# Patient Record
Sex: FEMALE | Race: WHITE | URBAN - METROPOLITAN AREA
[De-identification: names, ages, dates, MRNs, and addresses within clinical notes are randomized per-mention and may not be internally consistent; named-entity substitution may affect disease eponyms.]

---

## 2017-01-01 ENCOUNTER — TELEPHONE (OUTPATIENT)
Dept: GASTROENTEROLOGY | Facility: CLINIC | Age: 67
End: 2017-01-01

## 2017-01-01 ENCOUNTER — OFFICE VISIT (OUTPATIENT)
Dept: RADIOLOGY | Facility: CLINIC | Age: 67
End: 2017-01-01

## 2017-01-01 ENCOUNTER — APPOINTMENT (OUTPATIENT)
Dept: SPEECH THERAPY | Facility: CLINIC | Age: 67
DRG: 442 | End: 2017-01-01
Attending: PHYSICIAN ASSISTANT
Payer: MEDICARE

## 2017-01-01 ENCOUNTER — APPOINTMENT (OUTPATIENT)
Dept: PHYSICAL THERAPY | Facility: CLINIC | Age: 67
DRG: 442 | End: 2017-01-01
Attending: PHYSICIAN ASSISTANT
Payer: MEDICARE

## 2017-01-01 ENCOUNTER — APPOINTMENT (OUTPATIENT)
Dept: OCCUPATIONAL THERAPY | Facility: CLINIC | Age: 67
DRG: 442 | End: 2017-01-01
Attending: PHYSICIAN ASSISTANT
Payer: MEDICARE

## 2017-01-01 ENCOUNTER — PRE VISIT (OUTPATIENT)
Dept: GASTROENTEROLOGY | Facility: CLINIC | Age: 67
End: 2017-01-01

## 2017-01-01 ENCOUNTER — TELEPHONE (OUTPATIENT)
Dept: INTERVENTIONAL RADIOLOGY/VASCULAR | Facility: CLINIC | Age: 67
End: 2017-01-01

## 2017-01-01 ENCOUNTER — HOSPITAL ENCOUNTER (EMERGENCY)
Facility: CLINIC | Age: 67
Discharge: NURSING, REHAB OR LONG-TERM CARE | End: 2017-01-06
Attending: EMERGENCY MEDICINE | Admitting: EMERGENCY MEDICINE
Payer: MEDICARE

## 2017-01-01 ENCOUNTER — CARE COORDINATION (OUTPATIENT)
Dept: CARDIOLOGY | Facility: CLINIC | Age: 67
End: 2017-01-01

## 2017-01-01 ENCOUNTER — APPOINTMENT (OUTPATIENT)
Dept: ULTRASOUND IMAGING | Facility: CLINIC | Age: 67
End: 2017-01-01
Attending: EMERGENCY MEDICINE
Payer: MEDICARE

## 2017-01-01 ENCOUNTER — HOSPITAL ENCOUNTER (INPATIENT)
Dept: VASCULAR ULTRASOUND | Facility: CLINIC | Age: 67
DRG: 442 | End: 2017-03-31
Attending: PHYSICIAN ASSISTANT
Payer: MEDICARE

## 2017-01-01 ENCOUNTER — DOCUMENTATION ONLY (OUTPATIENT)
Dept: OTHER | Facility: CLINIC | Age: 67
End: 2017-01-01

## 2017-01-01 ENCOUNTER — RECORDS - HEALTHEAST (OUTPATIENT)
Dept: LAB | Facility: CLINIC | Age: 67
End: 2017-01-01

## 2017-01-01 ENCOUNTER — APPOINTMENT (OUTPATIENT)
Dept: GENERAL RADIOLOGY | Facility: CLINIC | Age: 67
End: 2017-01-01
Attending: EMERGENCY MEDICINE
Payer: MEDICARE

## 2017-01-01 ENCOUNTER — OFFICE VISIT (OUTPATIENT)
Dept: GASTROENTEROLOGY | Facility: CLINIC | Age: 67
End: 2017-01-01
Attending: INTERNAL MEDICINE
Payer: MEDICARE

## 2017-01-01 ENCOUNTER — TRANSFERRED RECORDS (OUTPATIENT)
Dept: HEALTH INFORMATION MANAGEMENT | Facility: CLINIC | Age: 67
End: 2017-01-01

## 2017-01-01 ENCOUNTER — APPOINTMENT (OUTPATIENT)
Dept: ULTRASOUND IMAGING | Facility: CLINIC | Age: 67
DRG: 442 | End: 2017-01-01
Attending: PHYSICIAN ASSISTANT
Payer: MEDICARE

## 2017-01-01 ENCOUNTER — HOSPITAL ENCOUNTER (INPATIENT)
Facility: CLINIC | Age: 67
LOS: 3 days | Discharge: NURSING, REHAB OR LONG-TERM CARE | DRG: 442 | End: 2017-03-31
Attending: EMERGENCY MEDICINE | Admitting: INTERNAL MEDICINE
Payer: MEDICARE

## 2017-01-01 ENCOUNTER — APPOINTMENT (OUTPATIENT)
Dept: GENERAL RADIOLOGY | Facility: CLINIC | Age: 67
DRG: 442 | End: 2017-01-01
Attending: EMERGENCY MEDICINE
Payer: MEDICARE

## 2017-01-01 ENCOUNTER — APPOINTMENT (OUTPATIENT)
Dept: INTERVENTIONAL RADIOLOGY/VASCULAR | Facility: CLINIC | Age: 67
End: 2017-01-01
Attending: EMERGENCY MEDICINE
Payer: MEDICARE

## 2017-01-01 VITALS
SYSTOLIC BLOOD PRESSURE: 107 MMHG | DIASTOLIC BLOOD PRESSURE: 42 MMHG | HEART RATE: 80 BPM | TEMPERATURE: 98 F | RESPIRATION RATE: 20 BRPM | OXYGEN SATURATION: 94 % | BODY MASS INDEX: 41.02 KG/M2 | WEIGHT: 293 LBS | HEIGHT: 71 IN

## 2017-01-01 VITALS
HEART RATE: 76 BPM | BODY MASS INDEX: 35.94 KG/M2 | OXYGEN SATURATION: 98 % | SYSTOLIC BLOOD PRESSURE: 148 MMHG | DIASTOLIC BLOOD PRESSURE: 78 MMHG | WEIGHT: 257.6 LBS | TEMPERATURE: 98.1 F

## 2017-01-01 VITALS — DIASTOLIC BLOOD PRESSURE: 65 MMHG | HEART RATE: 78 BPM | SYSTOLIC BLOOD PRESSURE: 124 MMHG

## 2017-01-01 VITALS
TEMPERATURE: 97.4 F | WEIGHT: 189 LBS | SYSTOLIC BLOOD PRESSURE: 125 MMHG | BODY MASS INDEX: 26.36 KG/M2 | RESPIRATION RATE: 18 BRPM | DIASTOLIC BLOOD PRESSURE: 50 MMHG | OXYGEN SATURATION: 95 %

## 2017-01-01 DIAGNOSIS — R18.8 OTHER ASCITES: ICD-10-CM

## 2017-01-01 DIAGNOSIS — K76.82 HEPATIC ENCEPHALOPATHY (H): Primary | ICD-10-CM

## 2017-01-01 DIAGNOSIS — K76.82 HEPATIC ENCEPHALOPATHY (H): ICD-10-CM

## 2017-01-01 DIAGNOSIS — Z95.828 S/P TIPS (TRANSJUGULAR INTRAHEPATIC PORTOSYSTEMIC SHUNT): Primary | ICD-10-CM

## 2017-01-01 DIAGNOSIS — K74.60 LIVER CIRRHOSIS SECONDARY TO NASH (H): ICD-10-CM

## 2017-01-01 DIAGNOSIS — K74.60 LIVER CIRRHOSIS SECONDARY TO NASH (H): Primary | ICD-10-CM

## 2017-01-01 DIAGNOSIS — K75.81 LIVER CIRRHOSIS SECONDARY TO NASH (H): Primary | ICD-10-CM

## 2017-01-01 DIAGNOSIS — R18.8 ASCITES: ICD-10-CM

## 2017-01-01 DIAGNOSIS — K75.81 LIVER CIRRHOSIS SECONDARY TO NASH (H): ICD-10-CM

## 2017-01-01 DIAGNOSIS — R18.8 ASCITES OF LIVER: ICD-10-CM

## 2017-01-01 DIAGNOSIS — R60.0 BILATERAL EDEMA OF LOWER EXTREMITY: ICD-10-CM

## 2017-01-01 DIAGNOSIS — E87.70 HYPERVOLEMIA, UNSPECIFIED HYPERVOLEMIA TYPE: ICD-10-CM

## 2017-01-01 DIAGNOSIS — K74.69 OTHER CIRRHOSIS OF LIVER (H): ICD-10-CM

## 2017-01-01 DIAGNOSIS — Z71.89 ACP (ADVANCE CARE PLANNING): Chronic | ICD-10-CM

## 2017-01-01 DIAGNOSIS — N30.00 ACUTE CYSTITIS WITHOUT HEMATURIA: Primary | ICD-10-CM

## 2017-01-01 DIAGNOSIS — R41.82 ALTERED MENTAL STATUS, UNSPECIFIED ALTERED MENTAL STATUS TYPE: ICD-10-CM

## 2017-01-01 LAB
ABO + RH BLD: NORMAL
ABO + RH BLD: NORMAL
ALBUMIN SERPL-MCNC: 2 G/DL (ref 3.4–5)
ALBUMIN SERPL-MCNC: 2.1 G/DL (ref 3.4–5)
ALBUMIN SERPL-MCNC: 2.3 G/DL (ref 3.4–5)
ALBUMIN SERPL-MCNC: 2.4 G/DL (ref 3.4–5)
ALBUMIN SERPL-MCNC: 2.5 G/DL (ref 3.4–5)
ALBUMIN UR-MCNC: NEGATIVE MG/DL
ALP SERPL-CCNC: 112 U/L (ref 40–150)
ALP SERPL-CCNC: 152 U/L (ref 40–150)
ALP SERPL-CCNC: 154 U/L (ref 40–150)
ALP SERPL-CCNC: 162 U/L (ref 40–150)
ALP SERPL-CCNC: 93 U/L (ref 40–150)
ALP SERPL-CCNC: 97 U/L (ref 40–150)
ALP SERPL-CCNC: 99 U/L (ref 40–150)
ALT SERPL W P-5'-P-CCNC: 14 U/L (ref 0–50)
ALT SERPL W P-5'-P-CCNC: 15 U/L (ref 0–50)
ALT SERPL W P-5'-P-CCNC: 16 U/L (ref 0–50)
ALT SERPL W P-5'-P-CCNC: 16 U/L (ref 0–50)
ALT SERPL W P-5'-P-CCNC: 17 U/L (ref 0–50)
ALT SERPL W P-5'-P-CCNC: 18 U/L (ref 0–50)
ALT SERPL W P-5'-P-CCNC: 20 U/L (ref 0–50)
AMMONIA PLAS-SCNC: 194 UMOL/L (ref 10–50)
AMMONIA PLAS-SCNC: 25 UMOL/L (ref 10–50)
AMMONIA PLAS-SCNC: 79 UMOL/L (ref 10–50)
ANION GAP SERPL CALCULATED.3IONS-SCNC: 10 MMOL/L (ref 3–14)
ANION GAP SERPL CALCULATED.3IONS-SCNC: 10 MMOL/L (ref 3–14)
ANION GAP SERPL CALCULATED.3IONS-SCNC: 7 MMOL/L (ref 3–14)
ANION GAP SERPL CALCULATED.3IONS-SCNC: 8 MMOL/L (ref 3–14)
ANION GAP SERPL CALCULATED.3IONS-SCNC: 9 MMOL/L (ref 3–14)
APPEARANCE UR: CLEAR
AST SERPL W P-5'-P-CCNC: 19 U/L (ref 0–45)
AST SERPL W P-5'-P-CCNC: 20 U/L (ref 0–45)
AST SERPL W P-5'-P-CCNC: 22 U/L (ref 0–45)
AST SERPL W P-5'-P-CCNC: 23 U/L (ref 0–45)
AST SERPL W P-5'-P-CCNC: 30 U/L (ref 0–45)
BACTERIA SPEC CULT: ABNORMAL
BACTERIA SPEC CULT: NO GROWTH
BACTERIA SPEC CULT: NO GROWTH
BASOPHILS # BLD AUTO: 0 10E9/L (ref 0–0.2)
BASOPHILS # BLD AUTO: 0 10E9/L (ref 0–0.2)
BASOPHILS NFR BLD AUTO: 0.4 %
BASOPHILS NFR BLD AUTO: 0.8 %
BILIRUB DIRECT SERPL-MCNC: 0.3 MG/DL (ref 0–0.2)
BILIRUB DIRECT SERPL-MCNC: 0.4 MG/DL (ref 0–0.2)
BILIRUB SERPL-MCNC: 0.6 MG/DL (ref 0.2–1.3)
BILIRUB SERPL-MCNC: 0.8 MG/DL (ref 0.2–1.3)
BILIRUB SERPL-MCNC: 0.9 MG/DL (ref 0.2–1.3)
BILIRUB SERPL-MCNC: 1 MG/DL (ref 0.2–1.3)
BILIRUB SERPL-MCNC: 1.3 MG/DL (ref 0.2–1.3)
BILIRUB SERPL-MCNC: 1.3 MG/DL (ref 0.2–1.3)
BILIRUB SERPL-MCNC: 1.4 MG/DL (ref 0.2–1.3)
BILIRUB UR QL STRIP: NEGATIVE
BLD GP AB SCN SERPL QL: NORMAL
BLOOD BANK CMNT PATIENT-IMP: NORMAL
BUN SERPL-MCNC: 25 MG/DL (ref 7–30)
BUN SERPL-MCNC: 25 MG/DL (ref 7–30)
BUN SERPL-MCNC: 26 MG/DL (ref 7–30)
BUN SERPL-MCNC: 31 MG/DL (ref 7–30)
BUN SERPL-MCNC: 34 MG/DL (ref 7–30)
BUN SERPL-MCNC: 35 MG/DL (ref 7–30)
BUN SERPL-MCNC: 37 MG/DL (ref 7–30)
CALCIUM SERPL-MCNC: 7.4 MG/DL (ref 8.5–10.1)
CALCIUM SERPL-MCNC: 7.8 MG/DL (ref 8.5–10.1)
CALCIUM SERPL-MCNC: 8 MG/DL (ref 8.5–10.1)
CALCIUM SERPL-MCNC: 8.2 MG/DL (ref 8.5–10.1)
CALCIUM SERPL-MCNC: 8.4 MG/DL (ref 8.5–10.1)
CALCIUM SERPL-MCNC: 8.5 MG/DL (ref 8.5–10.1)
CALCIUM SERPL-MCNC: 8.6 MG/DL (ref 8.5–10.1)
CHLORIDE SERPL-SCNC: 102 MMOL/L (ref 94–109)
CHLORIDE SERPL-SCNC: 104 MMOL/L (ref 94–109)
CHLORIDE SERPL-SCNC: 104 MMOL/L (ref 94–109)
CHLORIDE SERPL-SCNC: 107 MMOL/L (ref 94–109)
CHLORIDE SERPL-SCNC: 108 MMOL/L (ref 94–109)
CHLORIDE SERPL-SCNC: 109 MMOL/L (ref 94–109)
CHLORIDE SERPL-SCNC: 110 MMOL/L (ref 94–109)
CO2 SERPL-SCNC: 25 MMOL/L (ref 20–32)
CO2 SERPL-SCNC: 26 MMOL/L (ref 20–32)
CO2 SERPL-SCNC: 27 MMOL/L (ref 20–32)
CO2 SERPL-SCNC: 28 MMOL/L (ref 20–32)
CO2 SERPL-SCNC: 28 MMOL/L (ref 20–32)
CO2 SERPL-SCNC: 29 MMOL/L (ref 20–32)
CO2 SERPL-SCNC: 30 MMOL/L (ref 20–32)
COLOR UR AUTO: ABNORMAL
CREAT SERPL-MCNC: 1.22 MG/DL (ref 0.6–1.1)
CREAT SERPL-MCNC: 1.33 MG/DL (ref 0.6–1.1)
CREAT SERPL-MCNC: 1.34 MG/DL (ref 0.6–1.1)
CREAT SERPL-MCNC: 1.39 MG/DL (ref 0.6–1.1)
CREAT SERPL-MCNC: 1.44 MG/DL (ref 0.6–1.1)
CREAT SERPL-MCNC: 1.45 MG/DL (ref 0.6–1.1)
CREAT SERPL-MCNC: 1.5 MG/DL (ref 0.52–1.04)
CREAT SERPL-MCNC: 1.53 MG/DL (ref 0.6–1.1)
CREAT SERPL-MCNC: 1.64 MG/DL (ref 0.52–1.04)
CREAT SERPL-MCNC: 1.69 MG/DL (ref 0.52–1.04)
CREAT SERPL-MCNC: 1.88 MG/DL (ref 0.52–1.04)
CREAT SERPL-MCNC: 1.91 MG/DL (ref 0.52–1.04)
DIFFERENTIAL METHOD BLD: ABNORMAL
DIFFERENTIAL METHOD BLD: ABNORMAL
EOSINOPHIL # BLD AUTO: 0.2 10E9/L (ref 0–0.7)
EOSINOPHIL # BLD AUTO: 0.3 10E9/L (ref 0–0.7)
EOSINOPHIL NFR BLD AUTO: 2.5 %
EOSINOPHIL NFR BLD AUTO: 5.5 %
ERYTHROCYTE [DISTWIDTH] IN BLOOD BY AUTOMATED COUNT: 15.7 % (ref 10–15)
ERYTHROCYTE [DISTWIDTH] IN BLOOD BY AUTOMATED COUNT: 15.8 % (ref 10–15)
ERYTHROCYTE [DISTWIDTH] IN BLOOD BY AUTOMATED COUNT: 16.1 % (ref 10–15)
ERYTHROCYTE [DISTWIDTH] IN BLOOD BY AUTOMATED COUNT: 16.1 % (ref 10–15)
ERYTHROCYTE [DISTWIDTH] IN BLOOD BY AUTOMATED COUNT: 16.2 % (ref 10–15)
ERYTHROCYTE [DISTWIDTH] IN BLOOD BY AUTOMATED COUNT: 16.4 % (ref 10–15)
ERYTHROCYTE [DISTWIDTH] IN BLOOD BY AUTOMATED COUNT: 16.6 % (ref 10–15)
GFR SERPL CREATININE-BSD FRML MDRD: 26 ML/MIN/1.7M2
GFR SERPL CREATININE-BSD FRML MDRD: 27 ML/MIN/1.7M2
GFR SERPL CREATININE-BSD FRML MDRD: 30 ML/MIN/1.7M2
GFR SERPL CREATININE-BSD FRML MDRD: 31 ML/MIN/1.7M2
GFR SERPL CREATININE-BSD FRML MDRD: 34 ML/MIN/1.73M2
GFR SERPL CREATININE-BSD FRML MDRD: 35 ML/MIN/1.7M2
GFR SERPL CREATININE-BSD FRML MDRD: 36 ML/MIN/1.73M2
GFR SERPL CREATININE-BSD FRML MDRD: 36 ML/MIN/1.73M2
GFR SERPL CREATININE-BSD FRML MDRD: 38 ML/MIN/1.73M2
GFR SERPL CREATININE-BSD FRML MDRD: 40 ML/MIN/1.73M2
GFR SERPL CREATININE-BSD FRML MDRD: 40 ML/MIN/1.73M2
GFR SERPL CREATININE-BSD FRML MDRD: 44 ML/MIN/1.73M2
GLUCOSE BLDC GLUCOMTR-MCNC: 103 MG/DL (ref 70–99)
GLUCOSE BLDC GLUCOMTR-MCNC: 105 MG/DL (ref 70–99)
GLUCOSE BLDC GLUCOMTR-MCNC: 108 MG/DL (ref 70–99)
GLUCOSE BLDC GLUCOMTR-MCNC: 109 MG/DL (ref 70–99)
GLUCOSE BLDC GLUCOMTR-MCNC: 115 MG/DL (ref 70–99)
GLUCOSE BLDC GLUCOMTR-MCNC: 116 MG/DL (ref 70–99)
GLUCOSE BLDC GLUCOMTR-MCNC: 118 MG/DL (ref 70–99)
GLUCOSE BLDC GLUCOMTR-MCNC: 123 MG/DL (ref 70–99)
GLUCOSE BLDC GLUCOMTR-MCNC: 123 MG/DL (ref 70–99)
GLUCOSE BLDC GLUCOMTR-MCNC: 124 MG/DL (ref 70–99)
GLUCOSE BLDC GLUCOMTR-MCNC: 124 MG/DL (ref 70–99)
GLUCOSE BLDC GLUCOMTR-MCNC: 125 MG/DL (ref 70–99)
GLUCOSE BLDC GLUCOMTR-MCNC: 127 MG/DL (ref 70–99)
GLUCOSE BLDC GLUCOMTR-MCNC: 132 MG/DL (ref 70–99)
GLUCOSE BLDC GLUCOMTR-MCNC: 136 MG/DL (ref 70–99)
GLUCOSE BLDC GLUCOMTR-MCNC: 139 MG/DL (ref 70–99)
GLUCOSE BLDC GLUCOMTR-MCNC: 145 MG/DL (ref 70–99)
GLUCOSE BLDC GLUCOMTR-MCNC: 145 MG/DL (ref 70–99)
GLUCOSE BLDC GLUCOMTR-MCNC: 148 MG/DL (ref 70–99)
GLUCOSE BLDC GLUCOMTR-MCNC: 155 MG/DL (ref 70–99)
GLUCOSE BLDC GLUCOMTR-MCNC: 157 MG/DL (ref 70–99)
GLUCOSE BLDC GLUCOMTR-MCNC: 165 MG/DL (ref 70–99)
GLUCOSE BLDC GLUCOMTR-MCNC: 169 MG/DL (ref 70–99)
GLUCOSE BLDC GLUCOMTR-MCNC: 188 MG/DL (ref 70–99)
GLUCOSE BLDC GLUCOMTR-MCNC: 217 MG/DL (ref 70–99)
GLUCOSE BLDC GLUCOMTR-MCNC: 217 MG/DL (ref 70–99)
GLUCOSE BLDC GLUCOMTR-MCNC: 236 MG/DL (ref 70–99)
GLUCOSE BLDC GLUCOMTR-MCNC: 259 MG/DL (ref 70–99)
GLUCOSE BLDC GLUCOMTR-MCNC: 299 MG/DL (ref 70–99)
GLUCOSE BLDC GLUCOMTR-MCNC: 334 MG/DL (ref 70–99)
GLUCOSE BLDC GLUCOMTR-MCNC: 436 MG/DL (ref 70–99)
GLUCOSE BLDC GLUCOMTR-MCNC: 442 MG/DL (ref 70–99)
GLUCOSE BLDC GLUCOMTR-MCNC: 449 MG/DL (ref 70–99)
GLUCOSE BLDC GLUCOMTR-MCNC: 451 MG/DL (ref 70–99)
GLUCOSE BLDC GLUCOMTR-MCNC: 481 MG/DL (ref 70–99)
GLUCOSE BLDC GLUCOMTR-MCNC: 500 MG/DL (ref 70–99)
GLUCOSE BLDC GLUCOMTR-MCNC: 514 MG/DL (ref 70–99)
GLUCOSE SERPL-MCNC: 114 MG/DL (ref 70–99)
GLUCOSE SERPL-MCNC: 121 MG/DL (ref 70–99)
GLUCOSE SERPL-MCNC: 122 MG/DL (ref 70–99)
GLUCOSE SERPL-MCNC: 154 MG/DL (ref 70–99)
GLUCOSE SERPL-MCNC: 160 MG/DL (ref 70–99)
GLUCOSE SERPL-MCNC: 260 MG/DL (ref 70–99)
GLUCOSE SERPL-MCNC: 72 MG/DL (ref 70–99)
GLUCOSE UR STRIP-MCNC: NEGATIVE MG/DL
HCT VFR BLD AUTO: 29.3 % (ref 35–47)
HCT VFR BLD AUTO: 29.6 % (ref 35–47)
HCT VFR BLD AUTO: 31.3 % (ref 35–47)
HCT VFR BLD AUTO: 31.9 % (ref 35–47)
HCT VFR BLD AUTO: 32.1 % (ref 35–47)
HCT VFR BLD AUTO: 32.5 % (ref 35–47)
HCT VFR BLD AUTO: 32.5 % (ref 35–47)
HGB BLD-MCNC: 10.1 G/DL (ref 11.7–15.7)
HGB BLD-MCNC: 10.2 G/DL (ref 11.7–15.7)
HGB BLD-MCNC: 10.2 G/DL (ref 11.7–15.7)
HGB BLD-MCNC: 9.6 G/DL (ref 11.7–15.7)
HGB BLD-MCNC: 9.6 G/DL (ref 11.7–15.7)
HGB BLD-MCNC: 9.7 G/DL (ref 11.7–15.7)
HGB BLD-MCNC: 9.9 G/DL (ref 11.7–15.7)
HGB UR QL STRIP: ABNORMAL
IMM GRANULOCYTES # BLD: 0 10E9/L (ref 0–0.4)
IMM GRANULOCYTES # BLD: 0 10E9/L (ref 0–0.4)
IMM GRANULOCYTES NFR BLD: 0.1 %
IMM GRANULOCYTES NFR BLD: 0.2 %
INR PPP: 1.19 (ref 0.86–1.14)
INR PPP: 1.21 (ref 0.86–1.14)
INR PPP: 1.23 (ref 0.86–1.14)
INR PPP: 1.24 (ref 0.86–1.14)
INR PPP: 1.25 (ref 0.86–1.14)
INR PPP: 1.34 (ref 0.86–1.14)
INR PPP: 1.34 (ref 0.86–1.14)
INTERPRETATION ECG - MUSE: NORMAL
INTERPRETATION ECG - MUSE: NORMAL
KETONES UR STRIP-MCNC: NEGATIVE MG/DL
LACTATE BLD-SCNC: 1.6 MMOL/L (ref 0.7–2.1)
LEUKOCYTE ESTERASE UR QL STRIP: NEGATIVE
LYMPHOCYTES # BLD AUTO: 0.6 10E9/L (ref 0.8–5.3)
LYMPHOCYTES # BLD AUTO: 0.8 10E9/L (ref 0.8–5.3)
LYMPHOCYTES NFR BLD AUTO: 14.4 %
LYMPHOCYTES NFR BLD AUTO: 9.2 %
Lab: ABNORMAL
MCH RBC QN AUTO: 27.6 PG (ref 26.5–33)
MCH RBC QN AUTO: 27.7 PG (ref 26.5–33)
MCH RBC QN AUTO: 27.9 PG (ref 26.5–33)
MCH RBC QN AUTO: 28.1 PG (ref 26.5–33)
MCH RBC QN AUTO: 28.1 PG (ref 26.5–33)
MCH RBC QN AUTO: 28.7 PG (ref 26.5–33)
MCH RBC QN AUTO: 28.9 PG (ref 26.5–33)
MCHC RBC AUTO-ENTMCNC: 30.2 G/DL (ref 31.5–36.5)
MCHC RBC AUTO-ENTMCNC: 31.4 G/DL (ref 31.5–36.5)
MCHC RBC AUTO-ENTMCNC: 31.4 G/DL (ref 31.5–36.5)
MCHC RBC AUTO-ENTMCNC: 31.6 G/DL (ref 31.5–36.5)
MCHC RBC AUTO-ENTMCNC: 31.7 G/DL (ref 31.5–36.5)
MCHC RBC AUTO-ENTMCNC: 32.4 G/DL (ref 31.5–36.5)
MCHC RBC AUTO-ENTMCNC: 32.8 G/DL (ref 31.5–36.5)
MCV RBC AUTO: 86 FL (ref 78–100)
MCV RBC AUTO: 88 FL (ref 78–100)
MCV RBC AUTO: 88 FL (ref 78–100)
MCV RBC AUTO: 89 FL (ref 78–100)
MCV RBC AUTO: 89 FL (ref 78–100)
MCV RBC AUTO: 91 FL (ref 78–100)
MCV RBC AUTO: 91 FL (ref 78–100)
MICRO REPORT STATUS: ABNORMAL
MICRO REPORT STATUS: NORMAL
MICRO REPORT STATUS: NORMAL
MICROORGANISM SPEC CULT: ABNORMAL
MONOCYTES # BLD AUTO: 0.7 10E9/L (ref 0–1.3)
MONOCYTES # BLD AUTO: 0.7 10E9/L (ref 0–1.3)
MONOCYTES NFR BLD AUTO: 10.4 %
MONOCYTES NFR BLD AUTO: 12.4 %
NEUTROPHILS # BLD AUTO: 3.5 10E9/L (ref 1.6–8.3)
NEUTROPHILS # BLD AUTO: 5.2 10E9/L (ref 1.6–8.3)
NEUTROPHILS NFR BLD AUTO: 66.7 %
NEUTROPHILS NFR BLD AUTO: 77.4 %
NITRATE UR QL: NEGATIVE
NRBC # BLD AUTO: 0 10*3/UL
NRBC # BLD AUTO: 0 10*3/UL
NRBC BLD AUTO-RTO: 0 /100
NRBC BLD AUTO-RTO: 1 /100
NT-PROBNP SERPL-MCNC: 744 PG/ML (ref 0–900)
PH UR STRIP: 7.5 PH (ref 5–7)
PLATELET # BLD AUTO: 105 10E9/L (ref 150–450)
PLATELET # BLD AUTO: 122 10E9/L (ref 150–450)
PLATELET # BLD AUTO: 141 10E9/L (ref 150–450)
PLATELET # BLD AUTO: 147 10E9/L (ref 150–450)
PLATELET # BLD AUTO: 157 10E9/L (ref 150–450)
PLATELET # BLD AUTO: 158 10E9/L (ref 150–450)
PLATELET # BLD AUTO: 172 10E9/L (ref 150–450)
POTASSIUM SERPL-SCNC: 3.7 MMOL/L (ref 3.4–5.3)
POTASSIUM SERPL-SCNC: 3.9 MMOL/L (ref 3.4–5.3)
POTASSIUM SERPL-SCNC: 4 MMOL/L (ref 3.4–5.3)
POTASSIUM SERPL-SCNC: 4.1 MMOL/L (ref 3.4–5.3)
POTASSIUM SERPL-SCNC: 4.5 MMOL/L (ref 3.4–5.3)
PROCALCITONIN SERPL-MCNC: 0.09 NG/ML
PROT SERPL-MCNC: 4.8 G/DL (ref 6.8–8.8)
PROT SERPL-MCNC: 5 G/DL (ref 6.8–8.8)
PROT SERPL-MCNC: 5.6 G/DL (ref 6.8–8.8)
PROT SERPL-MCNC: 6 G/DL (ref 6.8–8.8)
PROT SERPL-MCNC: 6 G/DL (ref 6.8–8.8)
PROT SERPL-MCNC: 6.1 G/DL (ref 6.8–8.8)
PROT SERPL-MCNC: 6.1 G/DL (ref 6.8–8.8)
RBC # BLD AUTO: 3.32 10E12/L (ref 3.8–5.2)
RBC # BLD AUTO: 3.42 10E12/L (ref 3.8–5.2)
RBC # BLD AUTO: 3.52 10E12/L (ref 3.8–5.2)
RBC # BLD AUTO: 3.52 10E12/L (ref 3.8–5.2)
RBC # BLD AUTO: 3.56 10E12/L (ref 3.8–5.2)
RBC # BLD AUTO: 3.62 10E12/L (ref 3.8–5.2)
RBC # BLD AUTO: 3.68 10E12/L (ref 3.8–5.2)
RBC #/AREA URNS AUTO: 1 /HPF (ref 0–2)
SODIUM SERPL-SCNC: 136 MMOL/L (ref 133–144)
SODIUM SERPL-SCNC: 139 MMOL/L (ref 133–144)
SODIUM SERPL-SCNC: 140 MMOL/L (ref 133–144)
SODIUM SERPL-SCNC: 145 MMOL/L (ref 133–144)
SODIUM SERPL-SCNC: 146 MMOL/L (ref 133–144)
SODIUM SERPL-SCNC: 146 MMOL/L (ref 133–144)
SODIUM SERPL-SCNC: 147 MMOL/L (ref 133–144)
SP GR UR STRIP: 1.01 (ref 1–1.03)
SPECIMEN EXP DATE BLD: NORMAL
SPECIMEN SOURCE: ABNORMAL
SPECIMEN SOURCE: NORMAL
SPECIMEN SOURCE: NORMAL
SQUAMOUS #/AREA URNS AUTO: <1 /HPF (ref 0–1)
URN SPEC COLLECT METH UR: ABNORMAL
UROBILINOGEN UR STRIP-MCNC: NORMAL MG/DL (ref 0–2)
WBC # BLD AUTO: 5.3 10E9/L (ref 4–11)
WBC # BLD AUTO: 6.7 10E9/L (ref 4–11)
WBC # BLD AUTO: 7 10E9/L (ref 4–11)
WBC # BLD AUTO: 7.1 10E9/L (ref 4–11)
WBC # BLD AUTO: 8.1 10E9/L (ref 4–11)
WBC # BLD AUTO: 8.3 10E9/L (ref 4–11)
WBC # BLD AUTO: 8.4 10E9/L (ref 4–11)
WBC #/AREA URNS AUTO: 3 /HPF (ref 0–2)

## 2017-01-01 PROCEDURE — 85025 COMPLETE CBC W/AUTO DIFF WBC: CPT | Performed by: EMERGENCY MEDICINE

## 2017-01-01 PROCEDURE — A9270 NON-COVERED ITEM OR SERVICE: HCPCS | Mod: GY | Performed by: PHYSICIAN ASSISTANT

## 2017-01-01 PROCEDURE — 12000006 ZZH R&B IMCU INTERMEDIATE UMMC

## 2017-01-01 PROCEDURE — 93975 VASCULAR STUDY: CPT | Mod: TC

## 2017-01-01 PROCEDURE — 36415 COLL VENOUS BLD VENIPUNCTURE: CPT | Performed by: PHYSICIAN ASSISTANT

## 2017-01-01 PROCEDURE — 00000146 ZZHCL STATISTIC GLUCOSE BY METER IP

## 2017-01-01 PROCEDURE — 25000125 ZZHC RX 250: Performed by: EMERGENCY MEDICINE

## 2017-01-01 PROCEDURE — 25000125 ZZHC RX 250: Performed by: INTERNAL MEDICINE

## 2017-01-01 PROCEDURE — 81001 URINALYSIS AUTO W/SCOPE: CPT | Performed by: EMERGENCY MEDICINE

## 2017-01-01 PROCEDURE — 40000133 ZZH STATISTIC OT WARD VISIT

## 2017-01-01 PROCEDURE — 97110 THERAPEUTIC EXERCISES: CPT | Mod: GP | Performed by: PHYSICAL THERAPIST

## 2017-01-01 PROCEDURE — 27210903 ZZH KIT CR5

## 2017-01-01 PROCEDURE — 84145 PROCALCITONIN (PCT): CPT | Performed by: PHYSICIAN ASSISTANT

## 2017-01-01 PROCEDURE — 49083 ABD PARACENTESIS W/IMAGING: CPT

## 2017-01-01 PROCEDURE — 25000132 ZZH RX MED GY IP 250 OP 250 PS 637: Mod: GY | Performed by: PHYSICIAN ASSISTANT

## 2017-01-01 PROCEDURE — 83880 ASSAY OF NATRIURETIC PEPTIDE: CPT | Performed by: EMERGENCY MEDICINE

## 2017-01-01 PROCEDURE — 25000131 ZZH RX MED GY IP 250 OP 636 PS 637: Mod: GY | Performed by: PHYSICIAN ASSISTANT

## 2017-01-01 PROCEDURE — 85027 COMPLETE CBC AUTOMATED: CPT | Performed by: PHYSICIAN ASSISTANT

## 2017-01-01 PROCEDURE — A9270 NON-COVERED ITEM OR SERVICE: HCPCS | Mod: GY | Performed by: INTERNAL MEDICINE

## 2017-01-01 PROCEDURE — 96374 THER/PROPH/DIAG INJ IV PUSH: CPT | Performed by: EMERGENCY MEDICINE

## 2017-01-01 PROCEDURE — 97166 OT EVAL MOD COMPLEX 45 MIN: CPT | Mod: GO | Performed by: OCCUPATIONAL THERAPIST

## 2017-01-01 PROCEDURE — 40000141 ZZH STATISTIC PERIPHERAL IV START W/O US GUIDANCE

## 2017-01-01 PROCEDURE — 83605 ASSAY OF LACTIC ACID: CPT | Performed by: EMERGENCY MEDICINE

## 2017-01-01 PROCEDURE — 25000132 ZZH RX MED GY IP 250 OP 250 PS 637: Mod: GY | Performed by: INTERNAL MEDICINE

## 2017-01-01 PROCEDURE — 71010 XR CHEST PORT 1 VW: CPT

## 2017-01-01 PROCEDURE — 99207 ZZC APP CREDIT; MD BILLING SHARED VISIT: CPT | Performed by: PHYSICIAN ASSISTANT

## 2017-01-01 PROCEDURE — 93010 ELECTROCARDIOGRAM REPORT: CPT | Mod: Z6 | Performed by: EMERGENCY MEDICINE

## 2017-01-01 PROCEDURE — 80053 COMPREHEN METABOLIC PANEL: CPT | Performed by: EMERGENCY MEDICINE

## 2017-01-01 PROCEDURE — 99285 EMERGENCY DEPT VISIT HI MDM: CPT | Mod: 25 | Performed by: EMERGENCY MEDICINE

## 2017-01-01 PROCEDURE — 99239 HOSP IP/OBS DSCHRG MGMT >30: CPT | Performed by: PHYSICIAN ASSISTANT

## 2017-01-01 PROCEDURE — 97535 SELF CARE MNGMENT TRAINING: CPT | Mod: GO | Performed by: OCCUPATIONAL THERAPIST

## 2017-01-01 PROCEDURE — 97162 PT EVAL MOD COMPLEX 30 MIN: CPT | Mod: GP | Performed by: PHYSICAL THERAPIST

## 2017-01-01 PROCEDURE — 27211039 ZZH NEEDLE CR2

## 2017-01-01 PROCEDURE — 99291 CRITICAL CARE FIRST HOUR: CPT | Mod: 25 | Performed by: EMERGENCY MEDICINE

## 2017-01-01 PROCEDURE — 82140 ASSAY OF AMMONIA: CPT | Performed by: EMERGENCY MEDICINE

## 2017-01-01 PROCEDURE — C1751 CATH, INF, PER/CENT/MIDLINE: HCPCS

## 2017-01-01 PROCEDURE — 85610 PROTHROMBIN TIME: CPT | Performed by: PHYSICIAN ASSISTANT

## 2017-01-01 PROCEDURE — 40000193 ZZH STATISTIC PT WARD VISIT: Performed by: PHYSICAL THERAPIST

## 2017-01-01 PROCEDURE — 87040 BLOOD CULTURE FOR BACTERIA: CPT | Performed by: EMERGENCY MEDICINE

## 2017-01-01 PROCEDURE — 40000556 ZZH STATISTIC PERIPHERAL IV START W US GUIDANCE

## 2017-01-01 PROCEDURE — 87086 URINE CULTURE/COLONY COUNT: CPT | Performed by: EMERGENCY MEDICINE

## 2017-01-01 PROCEDURE — 25000125 ZZHC RX 250: Performed by: PHYSICIAN ASSISTANT

## 2017-01-01 PROCEDURE — 12000003 ZZH R&B CRITICAL UMMC

## 2017-01-01 PROCEDURE — 36569 INSJ PICC 5 YR+ W/O IMAGING: CPT

## 2017-01-01 PROCEDURE — 80053 COMPREHEN METABOLIC PANEL: CPT | Performed by: PHYSICIAN ASSISTANT

## 2017-01-01 PROCEDURE — 99232 SBSQ HOSP IP/OBS MODERATE 35: CPT | Performed by: INTERNAL MEDICINE

## 2017-01-01 PROCEDURE — 99285 EMERGENCY DEPT VISIT HI MDM: CPT | Mod: 25

## 2017-01-01 PROCEDURE — 99213 OFFICE O/P EST LOW 20 MIN: CPT | Mod: ZF

## 2017-01-01 PROCEDURE — 99223 1ST HOSP IP/OBS HIGH 75: CPT | Mod: AI | Performed by: INTERNAL MEDICINE

## 2017-01-01 PROCEDURE — 27210732 ZZH ACCESSORY CR1

## 2017-01-01 PROCEDURE — 96374 THER/PROPH/DIAG INJ IV PUSH: CPT

## 2017-01-01 PROCEDURE — 85610 PROTHROMBIN TIME: CPT | Performed by: EMERGENCY MEDICINE

## 2017-01-01 PROCEDURE — 87186 SC STD MICRODIL/AGAR DIL: CPT | Performed by: EMERGENCY MEDICINE

## 2017-01-01 PROCEDURE — 97530 THERAPEUTIC ACTIVITIES: CPT | Mod: GP | Performed by: PHYSICAL THERAPIST

## 2017-01-01 PROCEDURE — 97535 SELF CARE MNGMENT TRAINING: CPT | Mod: GO

## 2017-01-01 PROCEDURE — 40000133 ZZH STATISTIC OT WARD VISIT: Performed by: OCCUPATIONAL THERAPIST

## 2017-01-01 PROCEDURE — 40000225 ZZH STATISTIC SLP WARD VISIT: Performed by: SPEECH-LANGUAGE PATHOLOGIST

## 2017-01-01 PROCEDURE — 97116 GAIT TRAINING THERAPY: CPT | Mod: GP | Performed by: PHYSICAL THERAPIST

## 2017-01-01 PROCEDURE — 93970 EXTREMITY STUDY: CPT

## 2017-01-01 PROCEDURE — 99233 SBSQ HOSP IP/OBS HIGH 50: CPT | Performed by: PHYSICIAN ASSISTANT

## 2017-01-01 PROCEDURE — 87088 URINE BACTERIA CULTURE: CPT | Performed by: EMERGENCY MEDICINE

## 2017-01-01 PROCEDURE — 25000132 ZZH RX MED GY IP 250 OP 250 PS 637: Mod: GY | Performed by: EMERGENCY MEDICINE

## 2017-01-01 PROCEDURE — 76700 US EXAM ABDOM COMPLETE: CPT | Mod: XS

## 2017-01-01 PROCEDURE — 93005 ELECTROCARDIOGRAM TRACING: CPT

## 2017-01-01 PROCEDURE — A9270 NON-COVERED ITEM OR SERVICE: HCPCS | Mod: GY | Performed by: EMERGENCY MEDICINE

## 2017-01-01 PROCEDURE — 92610 EVALUATE SWALLOWING FUNCTION: CPT | Mod: GN | Performed by: SPEECH-LANGUAGE PATHOLOGIST

## 2017-01-01 PROCEDURE — 25000128 H RX IP 250 OP 636: Performed by: INTERNAL MEDICINE

## 2017-01-01 RX ORDER — LACTULOSE 10 G/15ML
200 SOLUTION ORAL ONCE
Status: COMPLETED | OUTPATIENT
Start: 2017-01-01 | End: 2017-01-01

## 2017-01-01 RX ORDER — NICOTINE POLACRILEX 4 MG
15-30 LOZENGE BUCCAL
Status: DISCONTINUED | OUTPATIENT
Start: 2017-01-01 | End: 2017-01-01

## 2017-01-01 RX ORDER — ONDANSETRON 2 MG/ML
4 INJECTION INTRAMUSCULAR; INTRAVENOUS EVERY 6 HOURS PRN
Status: DISCONTINUED | OUTPATIENT
Start: 2017-01-01 | End: 2017-01-01 | Stop reason: HOSPADM

## 2017-01-01 RX ORDER — POLYETHYLENE GLYCOL 3350 17 G/17G
17 POWDER, FOR SOLUTION ORAL DAILY PRN
Status: DISCONTINUED | OUTPATIENT
Start: 2017-01-01 | End: 2017-01-01 | Stop reason: HOSPADM

## 2017-01-01 RX ORDER — LANOLIN ALCOHOL/MO/W.PET/CERES
100 CREAM (GRAM) TOPICAL DAILY
Status: DISCONTINUED | OUTPATIENT
Start: 2017-01-01 | End: 2017-01-01 | Stop reason: HOSPADM

## 2017-01-01 RX ORDER — LACTULOSE 10 G/15ML
20 SOLUTION ORAL 3 TIMES DAILY
Status: ON HOLD | COMMUNITY
Start: 2017-01-01 | End: 2017-01-01

## 2017-01-01 RX ORDER — LACTULOSE 10 G/15ML
20 SOLUTION ORAL 3 TIMES DAILY
Status: DISCONTINUED | OUTPATIENT
Start: 2017-01-01 | End: 2017-01-01 | Stop reason: HOSPADM

## 2017-01-01 RX ORDER — NALOXONE HYDROCHLORIDE 0.4 MG/ML
.1-.4 INJECTION, SOLUTION INTRAMUSCULAR; INTRAVENOUS; SUBCUTANEOUS
Status: DISCONTINUED | OUTPATIENT
Start: 2017-01-01 | End: 2017-01-01 | Stop reason: HOSPADM

## 2017-01-01 RX ORDER — LIDOCAINE 40 MG/G
CREAM TOPICAL
Status: DISCONTINUED | OUTPATIENT
Start: 2017-01-01 | End: 2017-01-01 | Stop reason: HOSPADM

## 2017-01-01 RX ORDER — NICOTINE POLACRILEX 4 MG
15-30 LOZENGE BUCCAL
Status: DISCONTINUED | OUTPATIENT
Start: 2017-01-01 | End: 2017-01-01 | Stop reason: HOSPADM

## 2017-01-01 RX ORDER — DEXTROSE MONOHYDRATE 25 G/50ML
25-50 INJECTION, SOLUTION INTRAVENOUS
Status: DISCONTINUED | OUTPATIENT
Start: 2017-01-01 | End: 2017-01-01

## 2017-01-01 RX ORDER — LACTULOSE 10 G/15ML
100 SOLUTION ORAL
Status: DISCONTINUED | OUTPATIENT
Start: 2017-01-01 | End: 2017-01-01

## 2017-01-01 RX ORDER — BUMETANIDE 1 MG/1
1 TABLET ORAL DAILY
Status: DISCONTINUED | OUTPATIENT
Start: 2017-01-01 | End: 2017-01-01 | Stop reason: HOSPADM

## 2017-01-01 RX ORDER — LACTULOSE 10 G/15ML
20 SOLUTION ORAL
Status: DISCONTINUED | OUTPATIENT
Start: 2017-01-01 | End: 2017-01-01

## 2017-01-01 RX ORDER — ONDANSETRON 4 MG/1
4 TABLET, ORALLY DISINTEGRATING ORAL EVERY 6 HOURS PRN
Status: DISCONTINUED | OUTPATIENT
Start: 2017-01-01 | End: 2017-01-01 | Stop reason: HOSPADM

## 2017-01-01 RX ORDER — LACTULOSE 10 G/15ML
20 SOLUTION ORAL DAILY PRN
Refills: 0 | DISCHARGE
Start: 2017-01-01

## 2017-01-01 RX ORDER — HEPARIN SODIUM,PORCINE 10 UNIT/ML
2-5 VIAL (ML) INTRAVENOUS
Status: DISCONTINUED | OUTPATIENT
Start: 2017-01-01 | End: 2017-01-01 | Stop reason: HOSPADM

## 2017-01-01 RX ORDER — SPIRONOLACTONE 25 MG/1
25 TABLET ORAL DAILY
Qty: 90 TABLET | Refills: 1 | Status: SHIPPED | OUTPATIENT
Start: 2017-01-01

## 2017-01-01 RX ORDER — ACETAMINOPHEN 325 MG/1
650 TABLET ORAL EVERY 8 HOURS PRN
Status: DISCONTINUED | OUTPATIENT
Start: 2017-01-01 | End: 2017-01-01 | Stop reason: HOSPADM

## 2017-01-01 RX ORDER — THIAMINE HYDROCHLORIDE 100 MG/ML
100 INJECTION, SOLUTION INTRAMUSCULAR; INTRAVENOUS DAILY
Status: DISCONTINUED | OUTPATIENT
Start: 2017-01-01 | End: 2017-01-01 | Stop reason: HOSPADM

## 2017-01-01 RX ORDER — DEXTROSE MONOHYDRATE 25 G/50ML
25-50 INJECTION, SOLUTION INTRAVENOUS
Status: DISCONTINUED | OUTPATIENT
Start: 2017-01-01 | End: 2017-01-01 | Stop reason: HOSPADM

## 2017-01-01 RX ORDER — SPIRONOLACTONE 50 MG/1
100 TABLET, FILM COATED ORAL DAILY
Status: DISCONTINUED | OUTPATIENT
Start: 2017-01-01 | End: 2017-01-01 | Stop reason: HOSPADM

## 2017-01-01 RX ORDER — LACTULOSE 10 G/15ML
20 SOLUTION ORAL 3 TIMES DAILY
Refills: 0 | DISCHARGE
Start: 2017-01-01

## 2017-01-01 RX ORDER — FUROSEMIDE 10 MG/ML
40 INJECTION INTRAMUSCULAR; INTRAVENOUS ONCE
Status: COMPLETED | OUTPATIENT
Start: 2017-01-01 | End: 2017-01-01

## 2017-01-01 RX ORDER — PANTOPRAZOLE SODIUM 40 MG/1
40 TABLET, DELAYED RELEASE ORAL
Status: DISCONTINUED | OUTPATIENT
Start: 2017-01-01 | End: 2017-01-01 | Stop reason: HOSPADM

## 2017-01-01 RX ORDER — LEVOFLOXACIN 500 MG/1
250 TABLET, FILM COATED ORAL
COMMUNITY
Start: 2017-01-01 | End: 2017-01-01

## 2017-01-01 RX ADMIN — LACTULOSE 20 G: 20 SOLUTION ORAL at 11:35

## 2017-01-01 RX ADMIN — LACTULOSE 20 G: 20 SOLUTION ORAL at 22:12

## 2017-01-01 RX ADMIN — RIFAXIMIN 550 MG: 550 TABLET ORAL at 20:03

## 2017-01-01 RX ADMIN — LACTULOSE 20 G: 20 SOLUTION ORAL at 08:32

## 2017-01-01 RX ADMIN — INSULIN ASPART 12 UNITS: 100 INJECTION, SOLUTION INTRAVENOUS; SUBCUTANEOUS at 09:36

## 2017-01-01 RX ADMIN — PANTOPRAZOLE SODIUM 40 MG: 40 TABLET, DELAYED RELEASE ORAL at 16:21

## 2017-01-01 RX ADMIN — LIDOCAINE HYDROCHLORIDE 2 ML: 10 INJECTION, SOLUTION INFILTRATION; PERINEURAL at 13:01

## 2017-01-01 RX ADMIN — INSULIN ASPART 6 UNITS: 100 INJECTION, SOLUTION INTRAVENOUS; SUBCUTANEOUS at 11:33

## 2017-01-01 RX ADMIN — Medication 100 MG: at 08:58

## 2017-01-01 RX ADMIN — LACTULOSE 200 G: 10 SOLUTION ORAL; RECTAL at 15:18

## 2017-01-01 RX ADMIN — LACTULOSE 20 G: 20 SOLUTION ORAL at 09:00

## 2017-01-01 RX ADMIN — INSULIN GLARGINE 16 UNITS: 100 INJECTION, SOLUTION SUBCUTANEOUS at 13:38

## 2017-01-01 RX ADMIN — INSULIN ASPART 1 UNITS: 100 INJECTION, SOLUTION INTRAVENOUS; SUBCUTANEOUS at 16:00

## 2017-01-01 RX ADMIN — HUMAN INSULIN 5.5 UNITS/HR: 100 INJECTION, SOLUTION SUBCUTANEOUS at 16:11

## 2017-01-01 RX ADMIN — LACTULOSE 20 G: 20 SOLUTION ORAL at 19:53

## 2017-01-01 RX ADMIN — LACTULOSE 20 G: 20 SOLUTION ORAL at 05:20

## 2017-01-01 RX ADMIN — LACTULOSE 100 G: 10 SOLUTION ORAL; RECTAL at 16:00

## 2017-01-01 RX ADMIN — LACTULOSE 100 G: 10 SOLUTION ORAL; RECTAL at 03:16

## 2017-01-01 RX ADMIN — HUMAN INSULIN 17.5 UNITS/HR: 100 INJECTION, SOLUTION SUBCUTANEOUS at 18:57

## 2017-01-01 RX ADMIN — PANTOPRAZOLE SODIUM 40 MG: 40 INJECTION, POWDER, FOR SOLUTION INTRAVENOUS at 08:33

## 2017-01-01 RX ADMIN — HUMAN INSULIN 3 UNITS/HR: 100 INJECTION, SOLUTION SUBCUTANEOUS at 22:11

## 2017-01-01 RX ADMIN — Medication 100 MG: at 08:33

## 2017-01-01 RX ADMIN — LACTULOSE 100 G: 10 SOLUTION ORAL; RECTAL at 10:45

## 2017-01-01 RX ADMIN — LACTULOSE 100 G: 10 SOLUTION ORAL; RECTAL at 13:18

## 2017-01-01 RX ADMIN — PANTOPRAZOLE SODIUM 40 MG: 40 INJECTION, POWDER, FOR SOLUTION INTRAVENOUS at 16:00

## 2017-01-01 RX ADMIN — VANCOMYCIN HYDROCHLORIDE 1250 MG: 10 INJECTION, POWDER, LYOPHILIZED, FOR SOLUTION INTRAVENOUS at 13:14

## 2017-01-01 RX ADMIN — PANTOPRAZOLE SODIUM 40 MG: 40 INJECTION, POWDER, FOR SOLUTION INTRAVENOUS at 08:40

## 2017-01-01 RX ADMIN — LACTULOSE 100 G: 10 SOLUTION ORAL; RECTAL at 08:45

## 2017-01-01 RX ADMIN — HUMAN INSULIN 12 UNITS/HR: 100 INJECTION, SOLUTION SUBCUTANEOUS at 17:53

## 2017-01-01 RX ADMIN — INSULIN ASPART 3 UNITS: 100 INJECTION, SOLUTION INTRAVENOUS; SUBCUTANEOUS at 03:15

## 2017-01-01 RX ADMIN — LACTULOSE 100 G: 10 SOLUTION ORAL; RECTAL at 00:46

## 2017-01-01 RX ADMIN — PANTOPRAZOLE SODIUM 40 MG: 40 TABLET, DELAYED RELEASE ORAL at 08:58

## 2017-01-01 RX ADMIN — THIAMINE HYDROCHLORIDE 100 MG: 100 INJECTION, SOLUTION INTRAMUSCULAR; INTRAVENOUS at 19:26

## 2017-01-01 RX ADMIN — RIFAXIMIN 550 MG: 550 TABLET ORAL at 19:53

## 2017-01-01 RX ADMIN — LACTULOSE 20 G: 20 SOLUTION ORAL at 03:16

## 2017-01-01 RX ADMIN — RIFAXIMIN 550 MG: 550 TABLET ORAL at 08:58

## 2017-01-01 RX ADMIN — LACTULOSE 100 G: 10 SOLUTION ORAL; RECTAL at 22:37

## 2017-01-01 RX ADMIN — LACTULOSE 100 G: 10 SOLUTION ORAL; RECTAL at 20:40

## 2017-01-01 RX ADMIN — INSULIN ASPART 1 UNITS: 100 INJECTION, SOLUTION INTRAVENOUS; SUBCUTANEOUS at 20:05

## 2017-01-01 RX ADMIN — LACTULOSE 20 G: 20 SOLUTION ORAL at 00:59

## 2017-01-01 RX ADMIN — LACTULOSE 20 G: 20 SOLUTION ORAL at 08:38

## 2017-01-01 RX ADMIN — LACTULOSE 20 G: 20 SOLUTION ORAL at 20:01

## 2017-01-01 RX ADMIN — RIFAXIMIN 550 MG: 550 TABLET ORAL at 08:33

## 2017-01-01 RX ADMIN — LACTULOSE 100 G: 10 SOLUTION ORAL; RECTAL at 05:28

## 2017-01-01 RX ADMIN — INSULIN ASPART 4 UNITS: 100 INJECTION, SOLUTION INTRAVENOUS; SUBCUTANEOUS at 00:57

## 2017-01-01 RX ADMIN — THIAMINE HYDROCHLORIDE 100 MG: 100 INJECTION, SOLUTION INTRAMUSCULAR; INTRAVENOUS at 08:38

## 2017-01-01 RX ADMIN — LACTULOSE 20 G: 20 SOLUTION ORAL at 13:37

## 2017-01-01 RX ADMIN — FUROSEMIDE 40 MG: 10 INJECTION, SOLUTION INTRAVENOUS at 13:05

## 2017-01-01 RX ADMIN — HUMAN INSULIN 17.5 UNITS/HR: 100 INJECTION, SOLUTION SUBCUTANEOUS at 19:52

## 2017-01-01 ASSESSMENT — ACTIVITIES OF DAILY LIVING (ADL)
SWALLOWING: 0-->SWALLOWS FOODS/LIQUIDS WITHOUT DIFFICULTY
COGNITION: 0 - NO COGNITION ISSUES REPORTED
BATHING: 1-->ASSISTIVE EQUIPMENT
TRANSFERRING: 4-->COMPLETELY DEPENDENT
BATHING: 4-->COMPLETELY DEPENDENT
COMMUNICATION: 2-->DIFFICULTY UNDERSTANDING AND SPEAKING (NOT RELATED TO LANGUAGE BARRIER)
CHANGE_IN_FUNCTIONAL_STATUS_SINCE_ONSET_OF_CURRENT_ILLNESS/INJURY: YES
TRANSFERRING: 1-->ASSISTIVE EQUIPMENT
RETIRED_COMMUNICATION: 0-->UNDERSTANDS/COMMUNICATES WITHOUT DIFFICULTY
DRESS: 4-->COMPLETELY DEPENDENT
FALL_HISTORY_WITHIN_LAST_SIX_MONTHS: YES
AMBULATION: 1-->ASSISTIVE EQUIPMENT
DRESS: 1-->ASSISTIVE EQUIPMENT
TOILETING: 4-->COMPLETELY DEPENDENT
AMBULATION: 4-->COMPLETELY DEPENDENT
RETIRED_EATING: 0-->INDEPENDENT
TOILETING: 1-->ASSISTIVE EQUIPMENT

## 2017-01-01 ASSESSMENT — ENCOUNTER SYMPTOMS
COLOR CHANGE: 0
DIFFICULTY URINATING: 0
DIARRHEA: 0
COUGH: 0
CHILLS: 0
ABDOMINAL PAIN: 0
FEVER: 0
NECK STIFFNESS: 0
NAUSEA: 0
VOMITING: 0
SHORTNESS OF BREATH: 1
LIGHT-HEADEDNESS: 0
AGITATION: 0
NECK PAIN: 0
ABDOMINAL DISTENTION: 1
POLYDIPSIA: 0

## 2017-01-01 ASSESSMENT — PAIN SCALES - GENERAL
PAINLEVEL: NO PAIN (0)
PAINLEVEL: NO PAIN (0)

## 2017-01-05 NOTE — TELEPHONE ENCOUNTER
1. I don't mind having the para orders come from me. I don't care where she gets her nora done.   2. A TIPS US was ordered by Stella Arana on 11/21/16. It is scheduled for 1/9/17 as is an IR follow up appointment, which is about 6 weeks post-TIPS, perfectly reasonable.   3. TIPS efficacy can take months to reach maximum benefit. She is only about 6 weeks out.  4. I don't think there is any utllity in contact IR. They have her scheduled for follow up on Monday. This is a chronic problem. THe weight increase they describe is over 4 weeks. She needs a paracentesis and evaluation of the TIPS. If they think she is too ill to wait (that is if she is hypoxic), then she would need an emergency room.      I spoke to Clover, nurse at the facility. Clover states that pt refuses to have another para done in [facility] as pt believes [facility] created the problem she is having now. Clover then stated that pt has not had a f/u from the TIPS procedure done this past November but pt has a f/u scheduled for Monday the 9th. I suggested to Clover that she get in touch with the IR department who performed the TIPS and ask for there advise. Also pt agrees to have a para done but only at [facility]. I informed Lorelei that our providers do not have privileges @ [facility] and that the orders for a para need to come from PCP. I suggested she contact house MD though as he was the one recommending contacting our clinic for pt's present issues. Please call if you need clarification.

## 2017-01-05 NOTE — TELEPHONE ENCOUNTER
Jodi ([facility]) called re: fluid retention. Wt on admission (12/8/16) 260 lb, today (1/5/17) 308 lb. Intermittent SOB: yesterday at rest, today on exertion. Swelling abdomen. Using lymphedema wraps. Dr. Mendez (medical director) assessed today, referred her to GI/liver. Pt taking spironolactone 25 mg BID, Bumex 0.5 BID. Would like to know how to proceed. Jodi can be reached at [phone]. Dr. Mendez [phone]. Message sent to Lo Khan LPN.

## 2017-01-05 NOTE — TELEPHONE ENCOUNTER
Addition to message below: Jodi stated they are wondering if, d/t recent TIPS procedure being done, a clot has formed. Message sent to Lo Khan LPN.

## 2017-01-06 NOTE — TELEPHONE ENCOUNTER
I called and spoke to Clover today at the facility. Informed Clover that Dr. Mckeon does not mind having the para orders coming from her. Clover then stated that pt was sent to [facility] this morning per [place] MD directions. Informed Clover that if pt would need a para next week @ [place], to contact our office. Clover agreed.

## 2017-01-06 NOTE — ED PROVIDER NOTES
History     Chief Complaint   Patient presents with     Leg Swelling     Shortness of Breath     HPI  Ursula Crockett is a 66 year old female with a history of diabetes, cirrhosis of the liver s/p TIPS, esophageal varices s/p banding (2013), GERD, and renal disease s/p appendectomy and hysterectomy who presents via ambulance from her rehab facility for evaluation of shortness of breath and leg swelling. Patient states she underwent a TIPS procedure at the end of November (per chart review 11/8/16), and after this procedure she began to develop progressively worsening bilateral lower extremity swelling, abdominal distention, and shortness of breath that has begun to spread to her bilateral upper extremities. She states since being placed at her rehab facility she has only gotten worse, and last week her ammonia level had increased to above 100. She reports a history of multiple paracentesis procedures, but is unable to recall the exact date of her most recent paracentesis. She denies fever or cough. She states she has gained approximately 60 pounds over the past 5 weeks. No history of CHF or myocardial infarction. She has been compliant with prescribed Bumex and spironolactone. Patient's bilateral lower extremity dressings are changed daily.     I have reviewed the Medications, Allergies, Past Medical and Surgical History, and Social History in the [facility] system.  Past Medical History   Diagnosis Date     Renal disease      mild elevation of Cr.     Diabetes (H)      Cirrhosis of liver (H)      due to fatty liver disease     Esophageal varices (H)      s/p banding 10/13     GERD (gastroesophageal reflux disease)        Past Surgical History   Procedure Laterality Date     Appendectomy       Hysterectomy       Tonsillectomy       Nephrectomy Left      Hernia repair         No family history on file.    Social History   Substance Use Topics     Smoking status: Former Smoker     Smokeless tobacco: Not on file      "Alcohol Use: No     No current facility-administered medications for this encounter.        Allergies   Allergen Reactions     Iodine Anaphylaxis     Talwin [Pentazocine] Shortness Of Breath     Contrast Dye      Eggs [Chicken-Derived Products (Egg)]      Tape [Adhesive Tape]      Codeine Rash     Penicillins Rash       Review of Systems   Constitutional: Negative for fever and chills.   HENT: Negative for congestion.    Eyes: Negative for visual disturbance.   Respiratory: Positive for shortness of breath. Negative for cough.    Cardiovascular: Positive for leg swelling (bilateral). Negative for chest pain.   Gastrointestinal: Positive for abdominal distention. Negative for nausea, vomiting, abdominal pain and diarrhea.   Endocrine: Negative for polydipsia and polyuria.   Genitourinary: Negative for difficulty urinating.   Musculoskeletal: Negative for neck pain and neck stiffness.   Skin: Negative for color change.   Neurological: Negative for light-headedness.   Psychiatric/Behavioral: Negative for behavioral problems and agitation.       Physical Exam   BP: 138/56 mmHg  Pulse: 76  Temp: 98.3  F (36.8  C)  Resp: 20  Height: 180.3 cm (5' 11\")  Weight: (!) 139.708 kg (308 lb)  SpO2: 96 %  Physical Exam   Constitutional: She is oriented to person, place, and time. She appears well-developed and well-nourished. No distress.   HENT:   Head: Normocephalic and atraumatic.   Mouth/Throat: Oropharynx is clear and moist. No oropharyngeal exudate.   Eyes: Conjunctivae and EOM are normal. No scleral icterus.   Neck: Normal range of motion.   Cardiovascular: Normal rate, normal heart sounds and intact distal pulses.    Pulmonary/Chest: Effort normal and breath sounds normal. No respiratory distress. She has no wheezes. She has no rales.   Abdominal: Soft. Bowel sounds are normal. She exhibits distension and ascites. There is no tenderness. There is no tenderness at McBurney's point and negative Villalta's sign. "   Musculoskeletal: She exhibits edema ( 3+ b/l LEs). She exhibits no tenderness.   Neurological: She is alert and oriented to person, place, and time. No cranial nerve deficit. She exhibits normal muscle tone. Coordination normal.   Skin: Skin is warm. No rash noted. She is not diaphoretic.   Psychiatric: She has a normal mood and affect. Her behavior is normal. Judgment and thought content normal.   Nursing note and vitals reviewed.      ED Course   Procedures       11:35 AM  The patient was seen and examined by Dr. Stinson in Room 19.          EKG Interpretation:      Interpreted by Tremayne Stinson  Time reviewed: 11:53 AM  Symptoms at time of EKG: shortness of breath   Rhythm: normal sinus   Rate: normal  Axis: normal  Ectopy: none  Conduction: normal  ST Segments/ T Waves: No ST-T wave changes  Q Waves: none  Comparison to prior: Unchanged from old EKG done on November 19, 2016    Clinical Impression: normal EKG          Critical Care time:  none               Labs Ordered and Resulted from Time of ED Arrival Up to the Time of Departure from the ED   CBC WITH PLATELETS DIFFERENTIAL - Abnormal; Notable for the following:     RBC Count 3.52 (*)     Hemoglobin 9.7 (*)     Hematocrit 32.1 (*)     MCHC 30.2 (*)     RDW 16.6 (*)     Platelet Count 122 (*)     Nucleated RBCs 1 (*)     Absolute Lymphocytes 0.6 (*)     All other components within normal limits   COMPREHENSIVE METABOLIC PANEL - Abnormal; Notable for the following:     Sodium 147 (*)     Chloride 110 (*)     Glucose 154 (*)     Creatinine 1.64 (*)     GFR Estimate 31 (*)     GFR Estimate If Black 38 (*)     Calcium 7.4 (*)     Albumin 2.0 (*)     Protein Total 4.8 (*)     Alkaline Phosphatase 162 (*)     All other components within normal limits   INR - Abnormal; Notable for the following:     INR 1.34 (*)     All other components within normal limits   GLUCOSE BY METER - Abnormal; Notable for the following:     Glucose 155 (*)     All other components within  normal limits   NT PROBNP INPATIENT   AMMONIA   PERIPHERAL IV CATHETER   PERIPHERAL IV CATHETER   CARDIAC CONTINUOUS MONITORING       Assessments & Plan (with Medical Decision Making)   66-year-old woman history of liver failure status post TIPS presenting with edema and abdominal wall distention causing difficulty breathing. Differential diagnosis: Ascites, fluid overload, liver failure, renal failure, TIPS malfunction, unlikely ACS or CHF.    After thorough history and physical exam patient appears to be in no acute distress. She does appear fluid overloaded on the examination and I will treat her with intravenous Lasix. I will also obtain a lower extremity venous Doppler as well as TIPS specific ultrasound and have IR perform therapeutic paracentesis for the patient. She agrees with plan.    Laboratory studies returned with no evidence of leukocytosis WBC is normal 6700. There is anemia with hemoglobin of 9.7, however, this is the baseline. Electrolytes show a creatinine of 1.64, however, this is close to patient s baseline. GFR is also at her baseline. AST and ALT are normal. BNP is slightly elevated, however, I do not believe the patient has CHF. I reviewed her chest x-ray and I read the radiology report; there is evidence of cardiomegaly and left sided pleural effusion.    I discussed patient's case and presentation with her hepatologist, Dr. Mckeon, who is well aware of her symptoms. According to Dr. Mckeon, patient has been dealing with weight gain and edema for several months and given the fact that she is hemodynamically stable without hypoxemia she agrees with me that the patient should not be admitted to the hospital. I believe this edema could be managed as an outpatient by her physician. She already has an appointment scheduled in 3 days with interventional radiologist for a follow-up status post TIPS procedure as well as hepatology appointment with the nurse practitioner arranged by Dr. Mckeon.  At this point ultrasound of lower extremities and abdomen are pending along with an ammonia level and if unremarkable she will be discharged back to the rehabilitation facility. She will be signed out to my partner pending these studies, reassessment,  and disposition.      I have reviewed the nursing notes.    I have reviewed the findings, diagnosis, plan and need for follow up with the patient.    Current Discharge Medication List          Final diagnoses:   Hypervolemia, unspecified hypervolemia type   Bilateral edema of lower extremity   Other ascites   I, Lilibeth Hernandez, am serving as a trained medical scribe to document services personally performed by Tremayne Stinson MD, based on the provider's statements to me.   I, Tremayne Stinson MD, was physically present and have reviewed and verified the accuracy of this note documented by Lilibeth Hernandez.        1/6/2017   [facility], [place], EMERGENCY DEPARTMENT      Tremayne Stinson MD  01/06/17 [id number]

## 2017-01-06 NOTE — ED AVS SNAPSHOT
[facility], Emergency Department    [address] [address]    Phone:  [phone]                                       Ursula Crockett   MRN: [id number]    Department:  [facility], Emergency Department   Date of Visit:  1/6/2017           Patient Information     Date Of Birth          1950        Your diagnoses for this visit were:     Hypervolemia, unspecified hypervolemia type     Bilateral edema of lower extremity     Other ascites        You were seen by Tremayne Stinson MD and Ye Johnson MD.      Follow-up Information     Follow up with Raudel Nicholson MD.    Specialty:  Internal Medicine    Contact information:    [facility]  [address]  [place] [address] [phone]          Discharge Instructions       Please make an appointment to follow up with Your Primary Care Provider and your Liver Specialist in 3-5 days for further evaluation and recommendations.    Ascites    Ascites is fluid collecting in the abdomen (stomach area). Symptoms include swelling of the abdomen and a feeling of pressure. Shortness of breath may also occur. In severe cases, the feet, ankles and legs may also swell.   There are many causes of ascites. The most common are related to the liver. They include:    Long-term alcohol abuse    Hepatitis    Diseases such as congestive heart failure, kidney failure, pancreatitis, or cancer  To treat the condition, a low-salt diet may be recommended. Medicines that help fluid leave the body (diuretics) may be prescribed. In some cases, a procedure is done to drain the abdomen of fluid. This is called paracentesis. Unless the underlying cause is treated, the fluid is likely to return.  If liver damage is due to alcohol, stopping all alcohol will help slow the progress of the disease. If liver damage is from hepatitis B or C, treatments may be given to fight the virus. If liver damage becomes life threatening, a liver  transplant may be needed.  Home care    Certain medicines can worsen liver damage. Talk to your healthcare provider or pharmacist about any medicines you currently take. Ask your healthcare provider or pharmacist before taking any new medicines. Also ask before taking herbs, vitamins, or minerals. Certain ones affect the liver.    Do not taking acetaminophen or ibuprofen without taking to your healthcare provider first. Both can affect your liver.     Stop all alcohol use. If you abuse alcohol, talk to your healthcare provider about getting help and support to stop.     If you use IV drugs, seek help to stop. Never share needles or other equipment.    Follow-up care  Follow up with your healthcare provider as advised. If a culture was done, call as directed for the results. Depending on the results, your treatment may change.  The following sources can tell you more about ascites and help you find support.    American Liver Foundation [phone] www.liverfoundation.org    Hepatitis Foundation International www.hepfi.org    Alcoholics Anonymous www.aa.org    National [place] on Alcoholism and Drug Dependence 483-104-1758 www.ncadd.org  When to seek medical advice  Call your healthcare provider right away if you have any of the following:    Sudden weight gain with increased size of your abdomen or leg swelling    Increasing jaundice (yellowing of skin or eyes)    Excess bleeding from cuts or injuries    Blood in vomit or stool (black or red color)    Trouble breathing    Increasing abdominal pain    Fever of 100.4 F (38 C) or higher, or as directed by your healthcare provider    [phone] [employer]. [address] [place] [address], [place] [address]. All rights reserved. This information is not intended as a substitute for professional medical care. Always follow your healthcare professional's instructions.        Coping with Edema  What is edema?  Edema is the build-up of fluid in the body, which causes  swelling. Swelling most commonly occurs in the feet, ankles, lower legs or hands.  Swelling can occur in the belly or chest may be a sign of a more severe problem.  Certain medicines or conditions can make the swelling worse.  Symptoms include:    Feet and lower legs get larger when you sit or walk.    Hands feel tight when you make a fist.    When you push on the skin, skin stays dented.    Shiny, tight skin.    Fast weight gain.  How is it treated?  Your care team may give you a medicine to reduce the swelling.  They may also suggest that you meet with a dietitian. He or she can help with food choices to reduce the swelling.  What can I do about the swelling?    Place your feet above your heart 3 times a day: Sit with your feet up on a stool with a pillow. Sit on the bed or couch with two pillows under your feet.    Do not stand for long periods of time.    Wear loose-fitting clothes.    Do not cross your legs.    Reduce the salt in your diet.   These foods are high in salt:    Chips, soup    Frozen meals, TV dinners    Mcneal, lunch meat, ham    Sauces (soy, canned spaghetti sauce)    Walk or do other exercise.    Wear compression stockings.    Drink water as normal.    Weigh yourself every day at the same time to keep track of weight gain.  When should I call my care team?  Call your care team if:    You have a hard time breathing.    You gained 5 pounds or more in 1 week.    Your hands or feet feel cold when you touch them.    You are peeing very little or not at all.    Swelling is moving up your arms or legs.    Your tongue is swelling.    You cannot eat for more than a day  If you have any side effects, call us. We can help you manage these problems.  For more information,  see:  www.chemocare.com  www.cancer.org/treatment/treatmentsandsideeffects/physicalsideeffects/dealingwithsymptomsathome  www.cancer.gov/cancertopics/coping/chemotherapy-and-you  Comments:  __________________________________________  __________________________________________  __________________________________________  __________________________________________  __________________________________________  __________________________________________  __________________________________________  For informational purposes only. Not to replace the advice of your health care provider.  Copyright   2014 [employer]. All rights reserved. [employer] 940352 - REV 03/16.    Your TIPS is working.  You have normal ammonia.  You have no clots in the legs.    Future Appointments        Provider Department Dept Phone Center    1/9/2017 7:00 AM  [facility]  [facility]  [phone] [facility]    1/9/2017 7:45 AM [facility] [phone] [facility]    1/9/2017 8:00 AM Dandre Haddad MD  [facility] [phone] [facility]      24 Hour Appointment Hotline       To make an appointment at any [facility], call [phone] ([phone]). If you don't have a family doctor or clinic, we will help you find one. [facility] are conveniently located to serve the needs of you and your family.             Review of your medicines      Our records show that you are taking the medicines listed below. If these are incorrect, please call your family doctor or clinic.        Dose / Directions Last dose taken    * bumetanide 1 MG tablet   Commonly known as:  BUMEX   Dose:  1 mg   Quantity:  3 tablet        Take 1 tablet (1 mg) by mouth daily   Refills:  0        * bumetanide 0.5 MG tablet   Commonly known as:  BUMEX   Dose:  0.5 mg   Quantity:  30 tablet        Take 1 tablet (0.5 mg) by mouth daily as needed If your weight increases by more than 5 pounds in a 3 day  period.  Call PCP once you start taking medication.   Refills:  0        ciprofloxacin 500 MG tablet   Commonly known as:  CIPRO   Dose:  500 mg   Quantity:  30 tablet        Take 1 tablet (500 mg) by mouth daily   Refills:  3        insulin glargine 100 UNIT/ML injection   Commonly known as:  LANTUS   Dose:  40 Units        Inject 40 Units Subcutaneous At Bedtime   Refills:  0        insulin lispro 100 UNIT/ML injection   Commonly known as:  HumaLOG   Dose:  10 Units   Indication:  Pt uses sliding scale        Inject 10 Units Subcutaneous 3 times daily (before meals) TID before meals,  or less 10 units; -150 12 units; -200 12 units; -250 13 units; -300 14 units; -350 15 units; -400 16 units   Refills:  0        levofloxacin 750 MG tablet   Commonly known as:  LEVAQUIN   Dose:  750 mg   Indication:  Community Acquired Pneumonia   Quantity:  2 tablet        Take 1 tablet (750 mg) by mouth every 48 hours   Refills:  0        methylPREDNISolone 32 MG tablet   Commonly known as:  MEDROL   Quantity:  2 tablet        Please take 32 mg, 12 hours prior to CT scan and then take another 32 mg, 2 hours prior to the CT scan   Refills:  0        OMEPRAZOLE PO   Dose:  20 mg        Take 20 mg by mouth every morning   Refills:  0        ZOLOFT PO   Dose:  100 mg        Take 100 mg by mouth daily   Refills:  0        * Notice:  This list has 2 medication(s) that are the same as other medications prescribed for you. Read the directions carefully, and ask your doctor or other care provider to review them with you.            Procedures and tests performed during your visit     Procedure/Test Number of Times Performed    Ammonia 1    Ammonia (on ice) 1    CBC with platelets differential 1    Cardiac Continuous Monitoring 1    Comprehensive metabolic panel 1    EKG 12-lead, tracing only 1    Glucose by meter 3    INR 1    IR Paracentesis 1    Nt probnp inpatient (BNP) 1    Peripheral IV  "catheter 1    Peripheral IV: Standard 1    US Abdomen Complete w Doppler Complete 1    US Lower Extremity Venous Duplex Bilateral 1    Vascular Access Care Adult IP Consult 1    XR Chest Port 1 View 1      Orders Needing Specimen Collection     None      Pending Results     Date and Time Order Name Status Description    2017 1238 US Abdomen Complete w Doppler Complete Preliminary     2017 1148 US Lower Extremity Venous Duplex Bilateral In process     2017 1138 EKG 12-lead, tracing only Preliminary             Pending Culture Results     No orders found from 2017 to 2017.            Thank you for choosing [facility]       Thank you for choosing [facility] for your care. Our goal is always to provide you with excellent care. Hearing back from our patients is one way we can continue to improve our services. Please take a few minutes to complete the written survey that you may receive in the mail after you visit with us. Thank you!        [employer] Information     [employer] lets you send messages to your doctor, view your test results, renew your prescriptions, schedule appointments and more. To sign up, go to [email or web address] . Click on \"Log in\" on the left side of the screen, which will take you to the Welcome page. Then click on \"Sign up Now\" on the right side of the page.     You will be asked to enter the access code listed below, as well as some personal information. Please follow the directions to create your username and password.     Your access code is: [email or web address]  Expires: 2017  8:04 AM     Your access code will  in 90 days. If you need help or a new code, please call your [facility] clinic or [phone].        Care EveryWhere ID     This is your Care EveryWhere ID. This could be used by other organizations to access your [facility] medical records  [id number]        After Visit Summary       This is your record. Keep this with you and show to your community pharmacist(s) and " doctor(s) at your next visit.

## 2017-01-06 NOTE — ED NOTES
Bed: ED19  Expected date: 1/6/17  Expected time: 10:02 AM  Means of arrival: Ambulance  Comments:  [id number]  66 female  Fluid retention  Yellow  10

## 2017-01-06 NOTE — ED AVS SNAPSHOT
[facility], [place], Emergency Department    [address] [place] [id number]    Phone:  [phone]                                       Ursula Crockett   MRN: [id number]    Department:  [facility], Emergency Department   Date of Visit:  1/6/2017           After Visit Summary Signature Page     I have received my discharge instructions, and my questions have been answered. I have discussed any challenges I see with this plan with the nurse or doctor.    ..........................................................................................................................................  Patient/Patient Representative Signature      ..........................................................................................................................................  Patient Representative Print Name and Relationship to Patient    ..................................................               ................................................  Date                                            Time    ..........................................................................................................................................  Reviewed by Signature/Title    ...................................................              ..............................................  Date                                                            Time

## 2017-01-06 NOTE — IR NOTE
Interventional Radiology Intra-procedural Nursing Note    Patient Name: Ursula Crockett  Medical Record Number: [id number]  Today's Date: January 6, 2017    Start Time: 1510  End of procedure time: 1535  Procedure: paracentesis  Report given to: GILBERTO Barney ED  Time pt departs:  1540      Other Notes: Pt here from ED to IR 7. Consent is on file and pt is very upset about her care and lack of communication. Dr Toney in and spoke with pt. Writer checked pt's Blood sugar due to taking insulin this am and pt is very concerned about her sugar dropping. BGM= 123. Pt prepped for paracentesis and time out done with Dr Toney with fire safety of 0. Total lxkakmu=2742 cc's. Tegaderm intact. Pt tolerated well and returns to ED.    Savita Gill

## 2017-01-06 NOTE — PROCEDURES
Interventional Radiology Brief Post Procedure Note    Procedure: @FVRISFRMTLINK([id number])@    Proceduralist: Roberto Carlos Toney MD    Assistant: None    Time Out: Prior to the start of the procedure and with procedural staff participation, I verbally confirmed the patient s identity using two indicators, relevant allergies, that the procedure was appropriate and matched the consent or emergent situation, and that the correct equipment/implants were available. Immediately prior to starting the procedure I conducted the Time Out with the procedural staff and re-confirmed the patient s name, procedure, and site/side. (The Joint Commission universal protocol was followed.)  Yes    Sedation: None. Local Anesthestic used    Findings: Successful paracentesis.    Estimated Blood Loss: Minimal    Fluoroscopy Time: Not applicable    SPECIMENS: None    Complications: 1. None     Condition: Stable    Plan: Patient to return PRN.  Patient to see Dr. Haddad on Monday.  Appreciate ER ordering TIPS US.     Comments: See dictated procedure note for full details.    Roberto Carlos Toney MD

## 2017-01-06 NOTE — ED NOTES
Received call from nurse Neris at [facility] looking for an update on pt's status. Pt was away to IR for paracentesis.

## 2017-01-06 NOTE — ED NOTES
Patient signed out to me by Dr Stinson after completion of her therapeutic paracentesis, with abdominal vascular US, venous doppler US of the lower extremities and ammonia pending.    Labs/Imaging    Results for orders placed or performed during the hospital encounter of 01/06/17 (from the past 24 hour(s))   EKG 12-lead, tracing only   Result Value Ref Range    Interpretation ECG Click View Image link to view waveform and result    CBC with platelets differential   Result Value Ref Range    WBC 6.7 4.0 - 11.0 10e9/L    RBC Count 3.52 (L) 3.8 - 5.2 10e12/L    Hemoglobin 9.7 (L) 11.7 - 15.7 g/dL    Hematocrit 32.1 (L) 35.0 - 47.0 %    MCV 91 78 - 100 fl    MCH 27.6 26.5 - 33.0 pg    MCHC 30.2 (L) 31.5 - 36.5 g/dL    RDW 16.6 (H) 10.0 - 15.0 %    Platelet Count 122 (L) 150 - 450 10e9/L    Diff Method Automated Method     % Neutrophils 77.4 %    % Lymphocytes 9.2 %    % Monocytes 10.4 %    % Eosinophils 2.5 %    % Basophils 0.4 %    % Immature Granulocytes 0.1 %    Nucleated RBCs 1 (H) 0 /100    Absolute Neutrophil 5.2 1.6 - 8.3 10e9/L    Absolute Lymphocytes 0.6 (L) 0.8 - 5.3 10e9/L    Absolute Monocytes 0.7 0.0 - 1.3 10e9/L    Absolute Eosinophils 0.2 0.0 - 0.7 10e9/L    Absolute Basophils 0.0 0.0 - 0.2 10e9/L    Abs Immature Granulocytes 0.0 0 - 0.4 10e9/L    Absolute Nucleated RBC 0.0    Comprehensive metabolic panel   Result Value Ref Range    Sodium 147 (H) 133 - 144 mmol/L    Potassium 3.9 3.4 - 5.3 mmol/L    Chloride 110 (H) 94 - 109 mmol/L    Carbon Dioxide 28 20 - 32 mmol/L    Anion Gap 8 3 - 14 mmol/L    Glucose 154 (H) 70 - 99 mg/dL    Urea Nitrogen 26 7 - 30 mg/dL    Creatinine 1.64 (H) 0.52 - 1.04 mg/dL    GFR Estimate 31 (L) >60 mL/min/1.7m2    GFR Estimate If Black 38 (L) >60 mL/min/1.7m2    Calcium 7.4 (L) 8.5 - 10.1 mg/dL    Bilirubin Total 0.6 0.2 - 1.3 mg/dL    Albumin 2.0 (L) 3.4 - 5.0 g/dL    Protein Total 4.8 (L) 6.8 - 8.8 g/dL    Alkaline Phosphatase 162 (H) 40 - 150 U/L    ALT 16 0 - 50 U/L     AST 22 0 - 45 U/L   Nt probnp inpatient (BNP)   Result Value Ref Range    N-Terminal Pro BNP Inpatient 744 0 - 900 pg/mL   INR   Result Value Ref Range    INR 1.34 (H) 0.86 - 1.14   Glucose by meter   Result Value Ref Range    Glucose 155 (H) 70 - 99 mg/dL   XR Chest Port 1 View    Narrative    XR CHEST PORT 1 VW  1/6/2017 1:35 PM      HISTORY: shortness of breath    COMPARISON: 11/19/2016    FINDINGS: Cardial mediastinal silhouette is enlarged, stable. The left  hemidiaphragm and left cardiac border are obscured. There is a  moderately sized left pleural effusion. Cannot rule out underlying  infection. Small right-sided pleural effusion.      Impression    IMPRESSION: Moderately sized left pleural effusion, increased in size.  Cannot rule out underlying left basilar consolidation.    I have personally reviewed the examination and initial interpretation  and I agree with the findings.    FLORIAN CEJA MD   Glucose by meter   Result Value Ref Range    Glucose 123 (H) 70 - 99 mg/dL   IR Paracentesis    Narrative    PROCEDURES:  1. Ultrasound guided paracentesis.    CLINICAL HISTORY: This is a patient with TIPS placed in November who  presents for paracentesis    Comparisons: 12/26/2016    Staff Radiologist: RONALD Toney MD    Medications: The patient was placed on continuous monitoring. No  intravenous sedation was administered.  The patient remained stable  throughout the procedure.    PROCEDURE: The patient understood the limitations, alternatives, and  risks of the procedure and requested the procedure be performed. Both  written and oral consent were obtained.    Right lower quadrant was prepped and draped in the usual sterile  fashion. Ten to one volume mixture of 1% lidocaine without epinephrine  buffered with 8.4% bicarbonate solution was used for local anesthesia.  Under ultrasound guidance, a 5 [employer] centesis needle  catheter was advanced into the peritoneal space. Image documenting  position was  entered into the patient's permanent record.    Catheter to vacuum drainage. 2100 cc clear yellow ascites aspirated.  Catheter removed. Sterile dressing applied. No immediate complication.      Impression    IMPRESSION: Ultrasound guided paracentesis. 2100 cc clear yellow  ascites aspirated.    PLAN:  Appreciate emergency room physician ordering tips ultrasound. Dr. Haddad will be alerted of patient's presents in the hospital. Patient  was scheduled to see him as an outpatient on Monday, however, may miss  this appointment if she is admitted.    I, BARTOLOME FRENCH MD, attest that I was present in the procedure room  for the entire procedure.    BARTOLOME FRENCH MD   Ammonia (on ice)   Result Value Ref Range    Ammonia 25 10 - 50 umol/L   US Abdomen Complete w Doppler Complete    Narrative    EXAMINATION: US ABDOMEN COMPLETE WITH DOPPLER COMPLETE, 1/6/2017 5:02  PM     COMPARISON: 10/24/2016    HISTORY: Status post TIPS 6 weeks ago    TECHNIQUE:  Grey-scale, color Doppler and spectral flow analysis.    Findings:     Liver: Cirrhotic appearing liver without focal mass The main portal  vein is patent with antegrade flow measuring 1.9 cm in diameter.    Gallbladder: The gallbladder is nondistended, and the wall is  thickened to 6.8 mm. No visualized stones. Negative sonographic  Villalta's    Bile Ducts: Both the intra and extrahepatic biliary system are of  normal caliber with the common duct measuring 6 mm in diameter.    Pancreas: Visualized portions of the head and body of the pancreas are  unremarkable. No peripancreatic fluid is visualized.    Kidneys: Right kidney demonstrates increased echotexture of the  cortex, without mass nor hydronephrosis.  The left kidney is  surgically absent the right kidney measures 10.9 cm in craniocaudal  extent.    Spleen: The spleen is unremarkable and measures 13.1 cm in sagittal  dimension.    Aorta and IVC: The visualized portions of the aorta and IVC are  unremarkable.    Fluid:  Mild ascites    DOPPLER:     There is flow antegrade the liver in the main portal vein:  62 cm/sec in the main portal vein  right portal vein is not well-visualized  Antegrade  at 13 cm/sec in the left portal vein    There is flow toward the liver in the hepatic artery:  236 cm/sec peak systolic flow  33 cm/sec minimum diastolic flow   0.86 resistive index     The stent velocities are  141 cm/sec at the portal vein  151 cm/sec in mid stent  84 cm/sec in the hepatic vein distal shunt end.     The splenic vein is patent and flow is towards the liver.     The left and middle hepatic veins are patent with flow towards the  IVC. The right hepatic vein is not well visualized.      Impression    Impression:     1. Patent TIPS. Borderline low velocity in the distal shunt end near  the hepatic vein, cannot exclude low-grade stenosis. Nonvisualization  of the right portal and hepatic veins.  2. Cirrhotic appearing liver, unchanged. Wall thickening of the  gallbladder, likely related.       Bilateral LE Venous doppler US (prelim radiol interp) No DVT.  Ammonia is normal.     At this point she can be discharged back to her care facility.    Impression:  Symptomatic ascites.  S/p TIPS. Borderline low velocity in shunt without occlusion is of uncertain significance particularly following acute ascites fluid withdrawal procedure.  No evidence of hyperammonemia or encephalopathy.  No DVT. Can continue diuretic management.            Ye Johnson MD  01/06/17 1806    Ye Johnson MD  01/06/17 1806

## 2017-01-07 NOTE — DISCHARGE INSTRUCTIONS
Please make an appointment to follow up with Your Primary Care Provider and your Liver Specialist in 3-5 days for further evaluation and recommendations.    Ascites    Ascites is fluid collecting in the abdomen (stomach area). Symptoms include swelling of the abdomen and a feeling of pressure. Shortness of breath may also occur. In severe cases, the feet, ankles and legs may also swell.   There are many causes of ascites. The most common are related to the liver. They include:    Long-term alcohol abuse    Hepatitis    Diseases such as congestive heart failure, kidney failure, pancreatitis, or cancer  To treat the condition, a low-salt diet may be recommended. Medicines that help fluid leave the body (diuretics) may be prescribed. In some cases, a procedure is done to drain the abdomen of fluid. This is called paracentesis. Unless the underlying cause is treated, the fluid is likely to return.  If liver damage is due to alcohol, stopping all alcohol will help slow the progress of the disease. If liver damage is from hepatitis B or C, treatments may be given to fight the virus. If liver damage becomes life threatening, a liver transplant may be needed.  Home care    Certain medicines can worsen liver damage. Talk to your healthcare provider or pharmacist about any medicines you currently take. Ask your healthcare provider or pharmacist before taking any new medicines. Also ask before taking herbs, vitamins, or minerals. Certain ones affect the liver.    Do not taking acetaminophen or ibuprofen without taking to your healthcare provider first. Both can affect your liver.     Stop all alcohol use. If you abuse alcohol, talk to your healthcare provider about getting help and support to stop.     If you use IV drugs, seek help to stop. Never share needles or other equipment.    Follow-up care  Follow up with your healthcare provider as advised. If a culture was done, call as directed for the results. Depending on the  results, your treatment may change.  The following sources can tell you more about ascites and help you find support.    American Liver Foundation [phone] www.liverfoundation.org    Hepatitis Foundation International www.hepfi.org    Alcoholics Anonymous www.aa.org    National [place] on Alcoholism and Drug Dependence 312-744-3261 www.ncadd.org  When to seek medical advice  Call your healthcare provider right away if you have any of the following:    Sudden weight gain with increased size of your abdomen or leg swelling    Increasing jaundice (yellowing of skin or eyes)    Excess bleeding from cuts or injuries    Blood in vomit or stool (black or red color)    Trouble breathing    Increasing abdominal pain    Fever of 100.4 F (38 C) or higher, or as directed by your healthcare provider    4834-3557 [employer]. [address] [place] [address]. All rights reserved. This information is not intended as a substitute for professional medical care. Always follow your healthcare professional's instructions.        Coping with Edema  What is edema?  Edema is the build-up of fluid in the body, which causes swelling. Swelling most commonly occurs in the feet, ankles, lower legs or hands.  Swelling can occur in the belly or chest may be a sign of a more severe problem.  Certain medicines or conditions can make the swelling worse.  Symptoms include:    Feet and lower legs get larger when you sit or walk.    Hands feel tight when you make a fist.    When you push on the skin, skin stays dented.    Shiny, tight skin.    Fast weight gain.  How is it treated?  Your care team may give you a medicine to reduce the swelling.  They may also suggest that you meet with a dietitian. He or she can help with food choices to reduce the swelling.  What can I do about the swelling?    Place your feet above your heart 3 times a day: Sit with your feet up on a stool with a pillow. Sit on the bed or couch with two  pillows under your feet.    Do not stand for long periods of time.    Wear loose-fitting clothes.    Do not cross your legs.    Reduce the salt in your diet.   These foods are high in salt:    Chips, soup    Frozen meals, TV dinners    Mcneal, lunch meat, ham    Sauces (soy, canned spaghetti sauce)    Walk or do other exercise.    Wear compression stockings.    Drink water as normal.    Weigh yourself every day at the same time to keep track of weight gain.  When should I call my care team?  Call your care team if:    You have a hard time breathing.    You gained 5 pounds or more in 1 week.    Your hands or feet feel cold when you touch them.    You are peeing very little or not at all.    Swelling is moving up your arms or legs.    Your tongue is swelling.    You cannot eat for more than a day  If you have any side effects, call us. We can help you manage these problems.  For more information, see:  www.chemocare.com  www.cancer.org/treatment/treatmentsandsideeffects/physicalsideeffects/dealingwithsymptomsathome  www.cancer.gov/cancertopics/coping/chemotherapy-and-you  Comments:  __________________________________________  __________________________________________  __________________________________________  __________________________________________  __________________________________________  __________________________________________  __________________________________________  For informational purposes only. Not to replace the advice of your health care provider.  Copyright   2014 [facility]. All rights reserved. [employer] [id number] - REV 03/16.    Your TIPS is working.  You have normal ammonia.  You have no clots in the legs.

## 2017-01-09 NOTE — PROGRESS NOTES
IR progress note:     67 y/o patient with h/o of HEADLEY cirrhosis and refractory ascites here at ~6wks post TIPS placement. Ascites volume significantly reduced with 2 occurrences of paracentesis over the past 6 wks with ~2L per drainage, in contrast prior removal of occasionally greater than 10L. She certainly has seen a great increase in subcutaneous edema of the lower abdominal wall, pelvis and lower extremities, with a weight gain of 50-60lbs since TIPS placement. She is on Bumex and Spironolactone and claims faithfulness to this dosing.   She is also having her legs wrapped by a lymphedema nurse which she thinks is helpful.  She denies confusion.  No other complaints. Anxious about maintaining TIPS patency as she is very happy about reduced ascites.     /65 mmHg  Pulse 78  Mild distress/anxiety at baseline when compared to prior encounters.   Distended abdomen without tenderness related to pitting edema of the lower abdominal wall and pelvic wall.   Bilateral LE 2-3+ pitting edema of thighs through feet.     Labs 1/9/2017:  INR: 1.34  T bili: 1.0            Creat: 1.50   MELD: 14    Ammonia 1/6/2017: 25    Liver u/s with Doppler 1/6/2017:   1. Patent TIPS. Borderline low velocity in the hepatic venous end of the shunt. Nonvisualization  of the right portal and hepatic veins.Retrograde flow in the LPV.   2. Cirrhotic appearing liver, unchanged. Wall thickening of the  gallbladder, likely related.       Impression/Plan: Patent TIPS without concerning sonographic features in the setting of marked clinical improvement of ascites volume. MELD 14.  She is experiencing the expected worsening subcutaneous edema which is actually quite profound. She was reassured that this is typically self limited but is variable as to how long it lasts following TIPS creation.  She is faithful to diuretic regimen which she will continue to follow.  Agree with lymphedema wraps and leg elevation when possible. No encephalopathy and  ammonia within normal limits. We will continue to follow along with her next visit scheduled at 3 months post. Hope to see improving edema at that time and continued diminished ascites.

## 2017-01-09 NOTE — MR AVS SNAPSHOT
After Visit Summary   2017    Ursula Crockett    MRN: [id number]           Patient Information     Date Of Birth          1950        Visit Information        Provider Department      2017 8:00 AM Dandre Haddad MD [facility]        Care Instructions    I will call you with your 3 mos f/u appt    You will be due to come back and meet with Dr. Haddad in 2017.    Please oleksandr me with any other questions     Stella Arana RN, BSN  Interventional Radiology Nurse Coordinator   Phone: [phone]          Follow-ups after your visit        Who to contact     Please call your clinic at [phone] to:    Ask questions about your health    Make or cancel appointments    Discuss your medicines    Learn about your test results    Speak to your doctor   If you have compliments or concerns about an experience at your clinic, or if you wish to file a complaint, please contact [facility] Patient Relations at [phone] or email us at [email or web address]         Additional Information About Your Visit        MyChart Information     [facility] is an electronic gateway that provides easy, online access to your medical records. With [facility], you can request a clinic appointment, read your test results, renew a prescription or communicate with your care team.     To sign up for [facility] visit the website at [email or web address]   You will be asked to enter the access code listed below, as well as some personal information. Please follow the directions to create your username and password.     Your access code is: [email or web address]  Expires: 2017  8:04 AM     Your access code will  in 90 days. If you need help or a new code, please contact your [facility] or call [phone] for assistance.        Care EveryWhere ID     This is your Care EveryWhere ID. This could be used by other organizations to  access your [facility] medical records  [id number]        Your Vitals Were     Pulse                   78            Blood Pressure from Last 3 Encounters:   01/09/17 124/65   01/06/17 107/42   12/26/16 140/61    Weight from Last 3 Encounters:   01/06/17 139.708 kg (308 lb)   11/21/16 108.001 kg (238 lb 1.6 oz)   10/28/16 97.523 kg (215 lb)              Today, you had the following     No orders found for display       Primary Care Provider Office Phone # Fax #    Raudel Nicholson [phone]       [facility] [place] [address]  [place] [address]        Thank you!     Thank you for choosing [facility]  for your care. Our goal is always to provide you with excellent care. Hearing back from our patients is one way we can continue to improve our services. Please take a few minutes to complete the written survey that you may receive in the mail after your visit with us. Thank you!             Your Updated Medication List - Protect others around you: Learn how to safely use, store and throw away your medicines at www.disposemymeds.org.          This list is accurate as of: 1/9/17  8:50 AM.  Always use your most recent med list.                   Brand Name Dispense Instructions for use    * bumetanide 1 MG tablet    BUMEX    3 tablet    Take 1 tablet (1 mg) by mouth daily       * bumetanide 0.5 MG tablet    BUMEX    30 tablet    Take 1 tablet (0.5 mg) by mouth daily as needed If your weight increases by more than 5 pounds in a 3 day period.  Call PCP once you start taking medication.       ciprofloxacin 500 MG tablet    CIPRO    30 tablet    Take 1 tablet (500 mg) by mouth daily       insulin glargine 100 UNIT/ML injection    LANTUS     Inject 40 Units Subcutaneous At Bedtime       insulin lispro 100 UNIT/ML injection    HumaLOG     Inject 10 Units Subcutaneous 3 times daily (before meals) TID before meals,  or less 10 units; -150 12 units; -200 12  units; -250 13 units; -300 14 units; -350 15 units; -400 16 units       levofloxacin 750 MG tablet    LEVAQUIN    2 tablet    Take 1 tablet (750 mg) by mouth every 48 hours       methylPREDNISolone 32 MG tablet    MEDROL    2 tablet    Please take 32 mg, 12 hours prior to CT scan and then take another 32 mg, 2 hours prior to the CT scan       OMEPRAZOLE PO      Take 20 mg by mouth every morning       ZOLOFT PO      Take 100 mg by mouth daily       * Notice:  This list has 2 medication(s) that are the same as other medications prescribed for you. Read the directions carefully, and ask your doctor or other care provider to review them with you.

## 2017-01-09 NOTE — Clinical Note
1/9/2017       RE: Ursula Crockett  [address]  [place] [facility] [address]     Dear Colleague,    Thank you for referring your patient, Ursula Crockett, to the [facility] at [facility]. Please see a copy of my visit note below.    IR progress note:     65 y/o patient with h/o of HEADLEY cirrhosis and refractory ascites here at ~6wks post TIPS placement. Ascites volume significantly reduced with 2 occurrences of paracentesis over the past 6 wks with ~2L per drainage, in contrast prior removal of occasionally greater than 10L. She certainly has seen a great increase in subcutaneous edema of the lower abdominal wall, pelvis and lower extremities, with a weight gain of 50-60lbs since TIPS placement. She is on Bumex and Spironolactone and claims faithfulness to this dosing.   She is also having her legs wrapped by a lymphedema nurse which she thinks is helpful.  She denies confusion.  No other complaints. Anxious about maintaining TIPS patency as she is very happy about reduced ascites.     /65 mmHg  Pulse 78  Mild distress/anxiety at baseline when compared to prior encounters.   Distended abdomen without tenderness related to pitting edema of the lower abdominal wall and pelvic wall.   Bilateral LE 2-3+ pitting edema of thighs through feet.     Labs 1/9/2017:  INR: 1.34  T bili: 1.0            Creat: 1.50   MELD: 14    Ammonia 1/6/2017: 25    Liver u/s with Doppler 1/6/2017:   1. Patent TIPS. Borderline low velocity in the hepatic venous end of the shunt. Nonvisualization  of the right portal and hepatic veins.Retrograde flow in the LPV.   2. Cirrhotic appearing liver, unchanged. Wall thickening of the  gallbladder, likely related.       Impression/Plan: Patent TIPS without concerning sonographic features in the setting of marked clinical improvement of ascites volume. MELD 14.  She is experiencing the expected worsening subcutaneous edema which is actually quite  profound. She was reassured that this is typically self limited but is variable as to how long it lasts following TIPS creation.  She is faithful to diuretic regimen which she will continue to follow.  Agree with lymphedema wraps and leg elevation when possible. No encephalopathy and ammonia within normal limits. We will continue to follow along with her next visit scheduled at 3 months post. Hope to see improving edema at that time and continued diminished ascites.     Again, thank you for allowing me to participate in the care of your patient.      Sincerely,    Dandre Haddad MD

## 2017-01-09 NOTE — PATIENT INSTRUCTIONS
I will call you with your 3 mos f/u appt    You will be due to come back and meet with Dr. Haddad in March 2017.    Please oleksandr me with any other questions     Stella Arana RN, BSN  Interventional Radiology Nurse Coordinator   Phone: [phone]

## 2017-01-09 NOTE — NURSING NOTE
Chief Complaint   Patient presents with     Vascular Disease     TIPS one month follow up        Filed Vitals:    01/09/17 0815   BP: 124/65   Pulse: 78       There is no weight on file to calculate BMI.                              Iva Garcia, CMA

## 2017-01-12 PROBLEM — K75.81 LIVER CIRRHOSIS SECONDARY TO NASH (H): Status: ACTIVE | Noted: 2017-01-01

## 2017-01-12 PROBLEM — K74.60 LIVER CIRRHOSIS SECONDARY TO NASH (H): Status: ACTIVE | Noted: 2017-01-01

## 2017-01-12 NOTE — TELEPHONE ENCOUNTER
Called daughter and left a VM regarding pt's 3 mos f/u s/p TIPS appt. Informed her of appt details with US /labs and f/u with Dr. Haddad. Informed her that I will send out a letter for follow up in regards to her tips appt.  Left direct line for daughter to return my call -regarding confirmation of this VM.     Stella Arana RN, BSN  Interventional Radiology Nurse Coordinator   Phone: [phone]

## 2017-01-27 NOTE — TELEPHONE ENCOUNTER
I spoke to Rajiv Ryan where pt resides ([phone]), and instructed Rajiv the need for pt to have pre-visit labs for appt on 2/10/17. I faxed lab orders to [phone] and requested to have them drawn on 2/6/17 and then fax results. Rajiv agreed.

## 2017-02-10 NOTE — MR AVS SNAPSHOT
After Visit Summary   2/10/2017    Ursula Crockett    MRN: [id number]           Patient Information     Date Of Birth          1950        Visit Information        Provider Department      2/10/2017 10:10 AM Crystal Mckeon MD [facility]        Today's Diagnoses     S/P TIPS (transjugular intrahepatic portosystemic shunt)    -  1    Liver cirrhosis secondary to HEADLEY (H)           Follow-ups after your visit        Your next 10 appointments already scheduled     Mar 10, 2017  8:00 AM CST   US ABDOMEN/PELVIS DUPLEX COMPLETE with UCUS3   [facility] ([facility])    [address] [address] [phone]           Please bring a list of your medicines (including vitamins, minerals and over-the-counter drugs). Also, tell your doctor about any allergies you may have. Wear comfortable clothes and leave your valuables at home.  Adults: No eating or drinking for 8 hours before the exam. You may take medicine with a small sip of water.  Children: - Children 6+ years: No food or drink for 6 hours before exam. - Children 1-5 years: No food or drink for 4 hours before exam. - Infants, breast-fed: may have breast milk up to 2 hours before exam. - Infants, formula: may have bottle until 4 hours before exam.  Please call the Imaging Department at your exam site with any questions.              Who to contact     If you have questions or need follow up information about today's clinic visit or your schedule please contact [facility] directly at [phone].  Normal or non-critical lab and imaging results will be communicated to you by MyChart, letter or phone within 4 business days after the clinic has received the results. If you do not hear from us within 7 days, please contact the clinic through MyChart or phone. If you have a critical or abnormal lab result, we will notify you by phone as soon as  "possible.  Submit refill requests through [employer] or call your pharmacy and they will forward the refill request to us. Please allow 3 business days for your refill to be completed.          Additional Information About Your Visit        [employer] Information     [employer] lets you send messages to your doctor, view your test results, renew your prescriptions, schedule appointments and more. To sign up, go to [email or web address] . Click on \"Log in\" on the left side of the screen, which will take you to the Welcome page. Then click on \"Sign up Now\" on the right side of the page.     You will be asked to enter the access code listed below, as well as some personal information. Please follow the directions to create your username and password.     Your access code is: [email or web address]  Expires: 2017  8:04 AM     Your access code will  in 90 days. If you need help or a new code, please call your [place] clinic or [phone].        Care EveryWhere ID     This is your Care EveryWhere ID. This could be used by other organizations to access your [place] medical records  [id number]        Your Vitals Were     Pulse Temperature Pulse Oximetry BMI (Body Mass Index)          76 98.1  F (36.7  C) (Oral) 98% 35.93 kg/m2         Blood Pressure from Last 3 Encounters:   02/10/17 148/78   17 124/65   17 107/42    Weight from Last 3 Encounters:   02/10/17 116.8 kg (257 lb 9.6 oz)   17 (!) 139.7 kg (308 lb)   16 108 kg (238 lb 1.6 oz)                 Today's Medication Changes          These changes are accurate as of: 2/10/17 11:59 PM.  If you have any questions, ask your nurse or doctor.               These medicines have changed or have updated prescriptions.        Dose/Directions    * BUMEX PO   This may have changed:  Another medication with the same name was changed. Make sure you understand how and when to take each.   Changed by:  Crystal Mckeon MD        Dose:  1 mg   Take " 1 mg by mouth as needed for edema   Refills:  0       * bumetanide 1 MG tablet   Commonly known as:  BUMEX   This may have changed:  how much to take   Used for:  Chronic diastolic heart failure (H)        Dose:  1 mg   Take 1 tablet (1 mg) by mouth daily   Quantity:  3 tablet   Refills:  0       levofloxacin 500 MG tablet   Commonly known as:  LEVAQUIN   This may have changed:  Another medication with the same name was removed. Continue taking this medication, and follow the directions you see here.   Changed by:  Crystal Mckeon MD        Dose:  250 mg   Take 250 mg by mouth   Refills:  0       * Notice:  This list has 2 medication(s) that are the same as other medications prescribed for you. Read the directions carefully, and ask your doctor or other care provider to review them with you.             Primary Care Provider Office Phone # Fax #    Raudel Nicholson [phone]       [facility] [address] [facility] [place] [address]        Thank you!     Thank you for choosing [facility]  for your care. Our goal is always to provide you with excellent care. Hearing back from our patients is one way we can continue to improve our services. Please take a few minutes to complete the written survey that you may receive in the mail after your visit with us. Thank you!             Your Updated Medication List - Protect others around you: Learn how to safely use, store and throw away your medicines at www.disposemymeds.org.          This list is accurate as of: 2/10/17 11:59 PM.  Always use your most recent med list.                   Brand Name Dispense Instructions for use    * BUMEX PO      Take 1 mg by mouth as needed for edema       * bumetanide 1 MG tablet    BUMEX    3 tablet    Take 1 tablet (1 mg) by mouth daily       ciprofloxacin 500 MG tablet    CIPRO    30 tablet    Take 1 tablet (500 mg) by mouth daily       insulin glargine 100 UNIT/ML injection     LANTUS     Inject 38 Units Subcutaneous At Bedtime       insulin lispro 100 UNIT/ML injection    HumaLOG     Inject 10 Units Subcutaneous 3 times daily (before meals) TID before meals,  or less 10 units; -150 12 units; -200 12 units; -250 13 units; -300 14 units; -350 15 units; -400 16 units       lactulose 10 GM/15ML solution    CHRONULAC     Take 20 g by mouth       levofloxacin 500 MG tablet    LEVAQUIN     Take 250 mg by mouth       methylPREDNISolone 32 MG tablet    MEDROL    2 tablet    Please take 32 mg, 12 hours prior to CT scan and then take another 32 mg, 2 hours prior to the CT scan       OMEPRAZOLE PO      Take 20 mg by mouth every morning       * SPIRONOLACTONE PO      Take 50 mg by mouth daily       * SPIRONOLACTONE PO      Take 100 mg by mouth daily       UNABLE TO FIND      MEDICATION NAME: MEDPASS 4OZ AT BEDTIME       ZOLOFT PO      Take 100 mg by mouth daily       * Notice:  This list has 4 medication(s) that are the same as other medications prescribed for you. Read the directions carefully, and ask your doctor or other care provider to review them with you.

## 2017-02-10 NOTE — LETTER
2/10/2017       RE: Ursula Crockett  [address]  [place] [facility] [address]     Dear Colleague,    Thank you for referring your patient, Ursula Crockett, to the [facility]PAT[facility] at [facility]. Please see a copy of my visit note below.    SUBJECTIVE:  Ursula is a 66-year-old woman with cirrhosis due to HEADLEY.  She is status post TIPS on 11/18/2017 (?).      Fluid accumulation had been a really significant problem, and I got several phones call from the nursing home regarding this and how to manage it.  Unfortunately, her creatinine (she has a single kidney) had precluded higher doses of diuretics, and we have been managing her as best we can with paracentesis while the TIPS started working.  Finally, she has had improvement.  She is actually 50 pounds down from where she was at her peak of 300.  She feels like she is finally turning the corner and is improved overall with respect to the fluid accumulation.  She does follow a low-sodium diet.  She says she is having trouble eating, but she forces herself to eat and try to maintain good nutrition.      She is still in the care center, and this is because weakness and imbalance.  She said she is not going to be able to start PT until she has better balance.  She is going to get PT from the chair, however.      She does not have the need to take lactulose very often.  She hates the taste of it but has about 3 bowel movements without it.  She has not had any episodes of encephalopathy.      She is up-to-date on her HCC screening, having had an ultrasound last month.  Her last EGD was 10/13/2016, and she has had 1 subsequently since then at an outside facility, I believe, but is status post TIPS in any case.      PHYSICAL EXAMINATION:   VITAL SIGNS:  Blood pressure is 148/78, temperature 98, pulse 76, O2 sats 98%, weight 257.   GENERAL:  Pleasant, in no acute distress, seated in a wheelchair.   HEENT:  No icterus, no oral lesions.    LYMPH:  No supraclavicular or cervical lymphadenopathy.   CARDIOVASCULAR:  Regular rate and rhythm.   CHEST:  Lungs are clear but decreased breath sounds bilaterally.   ABDOMEN:  Bowel sounds are present.  Abdomen is soft, moderately distended.   EXTREMITIES:  Woody edema bilaterally.   NEUROLOGIC:  Speech is fluent and clear.  No asterixis or tremor.      LABORATORY DATA:  Labs from the nursing home were reviewed.  Creatinine was 1.34 compared to 1.5 on 01/09.        MEDICATIONS:  Also reviewed from the care center outside records     ASSESSMENT AND PLAN:  A 66-year-old woman with nonalcoholic steatohepatitis cirrhosis, status post TIPS in November.  She is finally having some improvement in her overall fluid control and finally turning the corner with respect to this.  We will continue a low-sodium diet.  No change in her diuretics given her kidney function.  I think we are limited to where we are and just trying to do the best we can.       She is status post TIPS with a recent TIPS ultrasound with visit with Radiology.  I would not pursue endoscopy at this point given the improvement.  She is up-to-date on HCC screening and will be due again in several months.  I will see her back in about 3-4 months.     This was a 25 minute visit, over 50% counseling and coordination of care.       Again, thank you for allowing me to participate in the care of your patient.      Sincerely,    Crystal Mckeon MD

## 2017-02-10 NOTE — NURSING NOTE
"Chief Complaint   Patient presents with     RECHECK     3 mo follow up       Initial /78 mmHg  Pulse 76  Temp(Src) 98.1  F (36.7  C) (Oral)  Wt 116.847 kg (257 lb 9.6 oz)  SpO2 98% Estimated body mass index is 35.94 kg/(m^2) as calculated from the following:    Height as of 1/6/17: 1.803 m (5' 11\").    Weight as of this encounter: 116.847 kg (257 lb 9.6 oz).  Medication Reconciliation: complete       "

## 2017-02-10 NOTE — PROGRESS NOTES
SUBJECTIVE:  Ursula is a 66-year-old woman with cirrhosis due to HEADLEY.  She is status post TIPS on 11/18/2017 (?).      Fluid accumulation had been a really significant problem, and I got several phones call from the nursing home regarding this and how to manage it.  Unfortunately, her creatinine (she has a single kidney) had precluded higher doses of diuretics, and we have been managing her as best we can with paracentesis while the TIPS started working.  Finally, she has had improvement.  She is actually 50 pounds down from where she was at her peak of 300.  She feels like she is finally turning the corner and is improved overall with respect to the fluid accumulation.  She does follow a low-sodium diet.  She says she is having trouble eating, but she forces herself to eat and try to maintain good nutrition.      She is still in the care center, and this is because weakness and imbalance.  She said she is not going to be able to start PT until she has better balance.  She is going to get PT from the chair, however.      She does not have the need to take lactulose very often.  She hates the taste of it but has about 3 bowel movements without it.  She has not had any episodes of encephalopathy.      She is up-to-date on her HCC screening, having had an ultrasound last month.  Her last EGD was 10/13/2016, and she has had 1 subsequently since then at an outside facility, I believe, but is status post TIPS in any case.      PHYSICAL EXAMINATION:   VITAL SIGNS:  Blood pressure is 148/78, temperature 98, pulse 76, O2 sats 98%, weight 257.   GENERAL:  Pleasant, in no acute distress, seated in a wheelchair.   HEENT:  No icterus, no oral lesions.   LYMPH:  No supraclavicular or cervical lymphadenopathy.   CARDIOVASCULAR:  Regular rate and rhythm.   CHEST:  Lungs are clear but decreased breath sounds bilaterally.   ABDOMEN:  Bowel sounds are present.  Abdomen is soft, moderately distended.   EXTREMITIES:  Woody edema  bilaterally.   NEUROLOGIC:  Speech is fluent and clear.  No asterixis or tremor.      LABORATORY DATA:  Labs from the nursing home were reviewed.  Creatinine was 1.34 compared to 1.5 on 01/09.        MEDICATIONS:  Also reviewed from the care center outside records     ASSESSMENT AND PLAN:  A 66-year-old woman with nonalcoholic steatohepatitis cirrhosis, status post TIPS in November.  She is finally having some improvement in her overall fluid control and finally turning the corner with respect to this.  We will continue a low-sodium diet.  No change in her diuretics given her kidney function.  I think we are limited to where we are and just trying to do the best we can.       She is status post TIPS with a recent TIPS ultrasound with visit with Radiology.  I would not pursue endoscopy at this point given the improvement.  She is up-to-date on HCC screening and will be due again in several months.  I will see her back in about 3-4 months.     This was a 25 minute visit, over 50% counseling and coordination of care.

## 2017-02-17 NOTE — TELEPHONE ENCOUNTER
Pt calling to inform us that she will not have a ride for her upcoming 3/13 appt.   She states that it's hard for her to get to her appt and is requesting that she have an appt on Friday, as her son is off and that way he can bring her to the appt.     I informed her that Dr. Haddad does not have clinic on Fridays. However if this is the only day that she can make then we will go ahead and schedule the f/u US and lab work.     I will have Dr. Haddad review the images and then we will get back to her.   She agrees to plan.     Letter sent    Stella Arana RN, BSN  Interventional Radiology Nurse Coordinator   Phone: [phone]

## 2017-03-20 NOTE — TELEPHONE ENCOUNTER
Called pt and left a VM on her phone regarding f/u of her US results.   Spoke with Connor Sanchez [facility].   I called to f/u with pt as well as to inform her about the results.     Gricel, has informed that pt was admitted to [facility] last night due to high ammonia levels and she was encephalopathic.     I informed Gricel to keep us in touch and I will let Dr. Haddad know.      We will also plan on seeing her in 6 mos s/p TIPS placement for follow up.  Will schedule and send an appt reminder to Gricel via fax.     Stella Arana RN, BSN  Interventional Radiology Nurse Coordinator   Phone: [phone]

## 2017-03-28 PROBLEM — K76.82 HEPATIC ENCEPHALOPATHY (H): Status: ACTIVE | Noted: 2017-01-01

## 2017-03-28 PROBLEM — Z71.89 ACP (ADVANCE CARE PLANNING): Chronic | Status: ACTIVE | Noted: 2017-01-01

## 2017-03-28 NOTE — LETTER
Transition Communication Hand-off for Care Transitions to Next Level of Care Provider    Name: Ursula Crockett  MRN #: [id number]  Primary Care Provider: Raudel Nicholson     Primary Clinic: [facility] [place] [address] [place] [address]     Reason for Hospitalization:  Hepatic encephalopathy (H) [K72.90]  Altered mental status, unspecified altered mental status type [R41.82]  Admit Date/Time: 3/28/2017 12:20 PM  Discharge Date: 3-31-17  Payor Source: Payor: MEDICARE / Plan: MEDICARE / Product Type: Medicare /            Reason for Communication Hand-off Referral: Other Continuity of Care    Discharge Plan:  TCU: [facility]     Concern for non-adherence with plan of care:   Y/N No  Discharge Needs Assessment:  Needs       Most Recent Value    Equipment Currently Used at Home walker, rolling, grab bar [built in shower chair]    Transportation Available family or friend will provide          Already enrolled in Tele-monitoring program and name of program:  Unknown  Follow-up specialty is recommended: Yes    Follow-up plan:  Future Appointments  Date Time Provider Department Center   4/4/2017 7:45 AM  LAB [facility]   4/4/2017 8:50 AM Crystal Mckeon MD [facility]   6/12/2017 7:00 AM [address] [place] [address]   6/12/2017 8:00 AM  LAB [facility]   6/12/2017 8:20 AM Dandre Haddad MD [facility]       MAHOGANY Doherty, [facility]  [address] Unit   Pager [phone]  Phone [phone]      AVS/Discharge Summary is the source of truth; this is a helpful guide for improved communication of patient story

## 2017-03-28 NOTE — ED PROVIDER NOTES
History     Chief Complaint   Patient presents with     Abnormal Labs     Altered Mental Status     HPI  Ursula Crockett is a 66 year old female with a history of diabetes, HEADLEY cirrhosis, complicated by esophageal varices, status-post banding (2013) and abdominal ascites requiring large volume paracenteses, status-post TIPS on 11/18/2017 who presents via ambulance from nursing facility with altered mental status and elevated ammonia. Currently, the patient is rather somnolent and history is difficult to obtain. The patient does deny pain currently. She is unable to contribute to the history further. Per nursing facility, the patient's ammonia was found to be elevated to 192. Per Care Everywhere chart review, the patient was recently admitted to [facility] (3/20/17) for hepatic encephalopathy, and was discharged on 3/24/17 (4 days ago) with a plan to continue BID Rifaximin, increase lactulose to TID (goal of stooling 3-5x daily), and follow up with her primary physician, Dr. Mckeon in 2 weeks.     Past Medical History:   Diagnosis Date     Cirrhosis of liver (H)     due to fatty liver disease     Diabetes (H)      Esophageal varices (H)     s/p banding 10/13     GERD (gastroesophageal reflux disease)      Renal disease     mild elevation of Cr.       Past Surgical History:   Procedure Laterality Date     APPENDECTOMY       HERNIA REPAIR       HYSTERECTOMY       NEPHRECTOMY Left      TONSILLECTOMY         No family history on file.    Social History   Substance Use Topics     Smoking status: Former Smoker     Smokeless tobacco: Not on file     Alcohol use No     Current Facility-Administered Medications   Medication     lactulose (CHRONULAC) solution for enema prep 200 g     Daily 2 GRAM acetaminophen limit, unless fulminent liver failure or transaminases greater than or equal to 300 - 400, then none     rifaximin (XIFAXAN) tablet 550 mg     lactulose (CHRONULAC) solution 20 g    Or     lactulose (CHRONULAC)  "solution for enema prep 100 g     thiamine tablet 100 mg    Or     thiamine (B-1) injection 100 mg     Current Outpatient Prescriptions   Medication     SPIRONOLACTONE PO     lactulose (CHRONULAC) 10 GM/15ML solution     levofloxacin (LEVAQUIN) 500 MG tablet     UNABLE TO FIND     insulin glargine (LANTUS) 100 UNIT/ML injection     SPIRONOLACTONE PO     Bumetanide (BUMEX PO)     bumetanide (BUMEX) 1 MG tablet     ciprofloxacin (CIPRO) 500 MG tablet     OMEPRAZOLE PO     methylPREDNISolone (MEDROL) 32 MG tablet     insulin lispro (HUMALOG) 100 UNIT/ML VIAL     Sertraline HCl (ZOLOFT PO)        Allergies   Allergen Reactions     Iodine Anaphylaxis     Talwin [Pentazocine] Shortness Of Breath     Contrast Dye      Eggs [Chicken-Derived Products (Egg)]      Tape [Adhesive Tape]      Codeine Rash     Penicillins Rash        I have reviewed the Medications, Allergies, Past Medical and Surgical History, and Social History in the Epic system.    Review of Systems   Unable to perform ROS: Mental status change       Physical Exam   BP: 128/64  Heart Rate: 70  Temp: 98.7  F (37.1  C)  Resp: 16  SpO2: 97 %  Physical Exam   Constitutional: She is oriented to person, place, and time. She appears well-developed and well-nourished.   Unable to follow commands or give any history; occasionally says \"yes.\" moving all extremities; opens eyes to painful stimuli   HENT:   Head: Normocephalic and atraumatic.   Eyes: Conjunctivae are normal. Pupils are equal, round, and reactive to light.   Neck: Normal range of motion.   Cardiovascular: Normal rate, regular rhythm and normal heart sounds.    Pulmonary/Chest: Effort normal and breath sounds normal. No respiratory distress.   Abdominal: Soft. She exhibits no distension. There is no tenderness. There is no rebound and no guarding.   No tenderness to palpation   Musculoskeletal: She exhibits no tenderness.   Neurological: She is alert and oriented to person, place, and time. No cranial nerve " deficit. Coordination normal.   Withdraws extremities to pain; clonus   Skin: Skin is warm and dry.   Psychiatric: She has a normal mood and affect. Her behavior is normal. Thought content normal.       ED Course     ED Course     Procedures     12:43 PM  The patient was seen and examined by Dr. Allen in Room 21.               EKG Interpretation:      Interpreted by Priya Allen  Time reviewed: 1255  Symptoms at time of EKG: none   Rhythm: normal sinus   Rate: normal  Axis: normal  Ectopy: none  Conduction: normal  ST Segments/ T Waves: No ST-T wave changes  Q Waves: none  Comparison to prior: Unchanged    Clinical Impression: normal EKG          Critical Care time:  was 30 minutes for this patient excluding procedures.         Results for orders placed or performed during the hospital encounter of 03/28/17   XR Chest Port 1 View    Narrative    XR CHEST PORT 1 VW  3/28/2017 1:13 PM      HISTORY: ams    COMPARISON: 1/6/2017    FINDINGS: Portable AP view of the chest. Unchanged moderate left-sided  pleural effusion with overlying airspace opacities. No significant  change in the diffuse hazy opacities. Small right pleural effusion.  Heart size is normal. Thoracic aortic calcifications. TIPS, unchanged  in position.      Impression    IMPRESSION:   1. Unchanged moderate left-sided pleural effusion with overlying  atelectasis or consolidation.  2. Diffuse hazy opacities consistent with pulmonary edema versus  infection. Stable small right pleural effusion.    I have personally reviewed the examination and initial interpretation  and I agree with the findings.    IDRIS SHAH MD   Ammonia   Result Value Ref Range    Ammonia 194 (HH) 10 - 50 umol/L   Comprehensive metabolic panel   Result Value Ref Range    Sodium 145 (H) 133 - 144 mmol/L    Potassium 4.0 3.4 - 5.3 mmol/L    Chloride 107 94 - 109 mmol/L    Carbon Dioxide 29 20 - 32 mmol/L    Anion Gap 10 3 - 14 mmol/L    Glucose 122 (H) 70 - 99 mg/dL     Urea Nitrogen 34 (H) 7 - 30 mg/dL    Creatinine 1.88 (H) 0.52 - 1.04 mg/dL    GFR Estimate 27 (L) >60 mL/min/1.7m2    GFR Estimate If Black 32 (L) >60 mL/min/1.7m2    Calcium 8.4 (L) 8.5 - 10.1 mg/dL    Bilirubin Total 0.9 0.2 - 1.3 mg/dL    Albumin 2.5 (L) 3.4 - 5.0 g/dL    Protein Total 6.0 (L) 6.8 - 8.8 g/dL    Alkaline Phosphatase 112 40 - 150 U/L    ALT 15 0 - 50 U/L    AST 19 0 - 45 U/L   Lactic acid whole blood   Result Value Ref Range    Lactic Acid 1.6 0.7 - 2.1 mmol/L   CBC with platelets differential   Result Value Ref Range    WBC 5.3 4.0 - 11.0 10e9/L    RBC Count 3.62 (L) 3.8 - 5.2 10e12/L    Hemoglobin 10.1 (L) 11.7 - 15.7 g/dL    Hematocrit 31.9 (L) 35.0 - 47.0 %    MCV 88 78 - 100 fl    MCH 27.9 26.5 - 33.0 pg    MCHC 31.7 31.5 - 36.5 g/dL    RDW 16.1 (H) 10.0 - 15.0 %    Platelet Count 158 150 - 450 10e9/L    Diff Method Automated Method     % Neutrophils 66.7 %    % Lymphocytes 14.4 %    % Monocytes 12.4 %    % Eosinophils 5.5 %    % Basophils 0.8 %    % Immature Granulocytes 0.2 %    Nucleated RBCs 0 0 /100    Absolute Neutrophil 3.5 1.6 - 8.3 10e9/L    Absolute Lymphocytes 0.8 0.8 - 5.3 10e9/L    Absolute Monocytes 0.7 0.0 - 1.3 10e9/L    Absolute Eosinophils 0.3 0.0 - 0.7 10e9/L    Absolute Basophils 0.0 0.0 - 0.2 10e9/L    Abs Immature Granulocytes 0.0 0 - 0.4 10e9/L    Absolute Nucleated RBC 0.0    UA with Microscopic   Result Value Ref Range    Color Urine Light Yellow     Appearance Urine Clear     Glucose Urine Negative NEG mg/dL    Bilirubin Urine Negative NEG    Ketones Urine Negative NEG mg/dL    Specific Gravity Urine 1.007 1.003 - 1.035    Blood Urine Small (A) NEG    pH Urine 7.5 (H) 5.0 - 7.0 pH    Protein Albumin Urine Negative NEG mg/dL    Urobilinogen mg/dL Normal 0.0 - 2.0 mg/dL    Nitrite Urine Negative NEG    Leukocyte Esterase Urine Negative NEG    Source Catheterized Urine     WBC Urine 3 (H) 0 - 2 /HPF    RBC Urine 1 0 - 2 /HPF    Squamous Epithelial /HPF Urine <1 0 - 1  /HPF   INR   Result Value Ref Range    INR 1.21 (H) 0.86 - 1.14   Glucose by meter   Result Value Ref Range    Glucose 139 (H) 70 - 99 mg/dL   EKG 12-lead, tracing only   Result Value Ref Range    Interpretation ECG Click View Image link to view waveform and result      Medications   Daily 2 GRAM acetaminophen limit, unless fulminent liver failure or transaminases greater than or equal to 300 - 400, then none (not administered)   rifaximin (XIFAXAN) tablet 550 mg (not administered)   lactulose (CHRONULAC) solution 20 g (not administered)     Or   lactulose (CHRONULAC) solution for enema prep 100 g (not administered)   thiamine tablet 100 mg ( Oral See Alternative 3/28/17 1926)     Or   thiamine (B-1) injection 100 mg (100 mg Intravenous Given 3/28/17 1926)   glucose 40 % gel 15-30 g (not administered)     Or   dextrose 50 % injection 25-50 mL (not administered)     Or   glucagon injection 1 mg (not administered)   insulin aspart (NovoLOG) inj (RAPID ACTING) (1 Units Subcutaneous Not Given 3/28/17 1738)   lactulose (CHRONULAC) solution for enema prep 200 g (200 g Rectal Given 3/28/17 1518)              Labs Ordered and Resulted from Time of ED Arrival Up to the Time of Departure from the ED   AMMONIA - Abnormal; Notable for the following:        Result Value    Ammonia 194 (*)     All other components within normal limits   COMPREHENSIVE METABOLIC PANEL - Abnormal; Notable for the following:     Sodium 145 (*)     Glucose 122 (*)     Urea Nitrogen 34 (*)     Creatinine 1.88 (*)     GFR Estimate 27 (*)     GFR Estimate If Black 32 (*)     Calcium 8.4 (*)     Albumin 2.5 (*)     Protein Total 6.0 (*)     All other components within normal limits   CBC WITH PLATELETS DIFFERENTIAL - Abnormal; Notable for the following:     RBC Count 3.62 (*)     Hemoglobin 10.1 (*)     Hematocrit 31.9 (*)     RDW 16.1 (*)     All other components within normal limits   INR - Abnormal; Notable for the following:     INR 1.21 (*)     All  other components within normal limits   LACTIC ACID WHOLE BLOOD   ROUTINE UA WITH MICROSCOPIC   ROUTINE UA WITH MICROSCOPIC   CARDIAC CONTINUOUS MONITORING   STRICT INTAKE AND OUTPUT   NURSE INITIATED LACTULOSE PROTOCOL   ASSESS   BLOOD CULTURE   BLOOD CULTURE   URINE CULTURE AEROBIC BACTERIAL   URINE CULTURE AEROBIC BACTERIAL       Assessments & Plan (with Medical Decision Making)  66-year-old female who was brought to the ER due to altered mental status.  Here, patient is A&O x 0.  Patient was found to have an ammonia above 190.  Patient is able to maintain her airway and is saying yes.  Patient has a history of TIPS procedure, and has had hepatic encephalopathy in the past.  Patient s creatinine is also elevated to 1.88 from a baseline of 1.5. Patient is receiving some rectal lactulose here in the ER. She will be admitted to Medicine for further care. Report was given to the Medicine team for further treatment.     I have reviewed the nursing notes.    I have reviewed the findings, diagnosis, plan and need for follow up with the patient.    New Prescriptions    No medications on file       Final diagnoses:   Hepatic encephalopathy (H)   Altered mental status, unspecified altered mental status type     I, Trino Torrez, am serving as a trained medical scribe to document services personally performed by Priya Allen MD, based on the provider's statements to me.   I, Priya Allen MD, was physically present and have reviewed and verified the accuracy of this note documented by Trino Torrez.     3/28/2017   [facility], [place], EMERGENCY DEPARTMENT     Priya Allen MD  03/28/17 1952

## 2017-03-28 NOTE — H&P
[facility] History and Physical  Department of Internal Medicine    Patient Name: Ursula Crockett MRN# [id number]   Age: 66 year old YOB: 1950     Date of Admission: 3/28/2017    Home clinic: [facility]  Primary care provider: Raudel Nicholson         Assessment and Plan:   Ursula Crockett is a 66 year old female with a past medical history of DM2, renal cancer s/p left nephrectomy, CKD stage 3 (of single kidney), HEADLEY cirrhosis c/b HE, EV s/p banding in 10/2016 and refractory ascites s/p TIPS 11/2016 who is admitted for AMS, found to have HE.    #Hepatic encephalopathy: S/P TIPS in 11/2016. Recent admission to [facility] 03/20-03/24 for HE (on lactulose), 02/25-02/28 for HE 2/2 noncompliance w/ lactulose. PTA maintained on rifaximin 550 mg BID, lactulose 30 mg TID titrated to 3-5 stools/day at NH. NH documentation w/o any missed doses of lactulose- but discussed with NH staff and they note that she hasn't been stooling. Ammonia of 194 in ED. No drugs (narcs or benzos). Infx w/u w/ CXR noting moderate left sided pluaral effusion w/ overlying atelecatsis or consolidation (unchanged), diffuse hazy opacities consistent w/ pulmonary edema vs. Infection, UA w/o evidence of infection, Afebrile, normal WBC count, lactic acid WNL. Hgb stable and no evidence of GI bleed. Doesn't appear intravascularly dry. Abd US from 03/10 w/ patent tips, increased shunt velocity which could be position dependent or concerning for impending TIPS failure. K stable, Glucose normal, bicarb normal, sodium mildly elevated, BUN mildly elevated but neither likely contributing. Vital signs w/ normal BP, normal HR, RR of 12 and sating well on RA. Most likely due to constipation on incorrect lactulose dosing.  -Lactulose protocol, rectal lactulose tonight until clears  -Continue rifaximin  -Complete abd US w/ dopplers ordered  -Para tomorrow  -BC x2 in process  -Consider discussing TIPS w/ IR pending US    #HEADLEY cirrhosis:  Follows w/ Dr. Mckeon. C/B EV s/p banding x7 10/16, portal hypertensive gastropathy, ascites s/p TIPS, requires nora once/3 weeks w/ 2 L ascitic fluid off. On bumex 1 mg qam, 0.5 mg qpm, aldactone 100 mg qam, 50 mg qafternoon,lactulose and rifaximin as above PTA. Platelets stable, bili stable, INR stable at 1.21.  MELD-Na score: 15 at 3/28/2017 12:17 PM  MELD score: 15 at 3/28/2017 12:17 PM  Calculated from:  Serum Creatinine: 1.88 mg/dL at 3/28/2017 12:17 PM  Serum Sodium: 145 mmol/L (Rounded to 137) at 3/28/2017 12:17 PM  Total Bilirubin: 0.9 mg/dL (Rounded to 1) at 3/28/2017 12:17 PM  INR(ratio): 1.21 at 3/28/2017 12:17 PM  Age: 66 years  -Hold diuretics w/ NPO status and HE  -Could use IV lasix PRN for hypervolemia  -Lactluose protocol as above  -Had previously been on daily Cipro for ? Of SBP prophylaxis vs UTI prophylaxis, no diagnostic taps at this facility (diagnostic para 04/18/2016 w/ 312 nucleated cells, only 3% neutrophils)    #Left sided pleural effusion: Present since 11/2016. ECHO 03/2017 at  w/ normal LV EF at 60% but grade II diastolic dysfunction, normal RV size and function, no valvular abnormalities. Refused thora at [facility] recently. Most likely 2/2 ascites and HEADLEY cirrhosis. Not requiring O2.   -Monitor O2 sats  -Consider thora pending symptoms  -Hold diuresis tonight while NPO    #DM2: On Lantus 32 units BID, humalog 5-12 unit custom subq TID AC. Hgb A1C 6.9 in 04/2016.  today.  -Q4 hour BG checks  -High insulin resistance SSI  -Half dose of lantus while NPO and not tolerating oral intake  -Hypoglycemia protocol    #CKD stage 3: Baseline creat ~1.5 since 10/2016, admitted w/ creat of 1.88, elevated BUN to 34. Creat 1.74 on recent admission to  03/24. Of note patient has single kidney as she is s/p left nephrectomy in 2011.   -Hold diuretics  -BMP qday  -Avoid nephrotoxics  -UA    #Normocytic anemia: Hgb of 10.1 on admission which is baseline. Normal MCV. Iron studies in  "04/2016 w/ low iron sat, normal IBC, normal iron. Likely due to liver disease, CKD.  -Monitor qday    CODE: DNR/DNI per POLST dated 12/2016  FEN: NPO w/ HE  DVT: Mechanical, SCDs  LINES: PIV  Disposition/Admission Status: >2 midnights for HE.     Plan of care was discussed with attending physician, Dr. Manzo.     Xiomara Zacarias PA-C  Hospitalist Service           Chief Complaint:   AMS         HPI:   History is obtained from the patient, NH staff, and medical record.     History limited due to patients encephalopathy. The patient notes she feels \"fine\". She denies any pain.     Discussed with NH RN, the patient was DCed 03/24, has been taking her lactulose three times daily but hasn't been stooling. NH RN denies any infectious symptoms.          Review of Systems:   A 10 point ROS was performed and negative unless otherwise noted in HPI.          Past Medical History:   Reviewed and updated in Epic.   Past Medical History:   Diagnosis Date     Cirrhosis of liver (H)     due to fatty liver disease     Diabetes (H)      Esophageal varices (H)     s/p banding 10/13     GERD (gastroesophageal reflux disease)      Renal disease     mild elevation of Cr.            Past Surgical History:   Reviewed and updated in Epic.   Past Surgical History:   Procedure Laterality Date     APPENDECTOMY       HERNIA REPAIR       HYSTERECTOMY       NEPHRECTOMY Left      TONSILLECTOMY              Social History:   Reviewed and updated in [facility].   Social History     Social History     Marital status:      Spouse name: N/A     Number of children: N/A     Years of education: N/A     Occupational History     Not on file.     Social History Main Topics     Smoking status: Former Smoker     Smokeless tobacco: Not on file     Alcohol use No     Drug use: No     Sexual activity: Not on file     Other Topics Concern     Not on file     Social History Narrative            Family History:   Reviewed and updated in Epic. No known family history " of cirrhosis         Allergies:      Allergies   Allergen Reactions     Iodine Anaphylaxis     Talwin [Pentazocine] Shortness Of Breath     Contrast Dye      Eggs [Chicken-Derived Products (Egg)]      Tape [Adhesive Tape]      Codeine Rash     Penicillins Rash            Medications:     Prescriptions Prior to Admission   Medication Sig Dispense Refill Last Dose     BUMETANIDE PO Take 0.5 mg by mouth every evening    Past Week at Unknown time     RIFAXIMIN PO Take 550 mg by mouth 2 times daily   3/28/2017 at 0800     lactulose (CHRONULAC) 10 GM/15ML solution Take 20 g by mouth 3 times daily    3/28/2017 at Unknown time     insulin glargine (LANTUS) 100 UNIT/ML injection Inject 32 Units Subcutaneous 2 times daily    3/28/2017 at 0800     SPIRONOLACTONE PO Take 100 mg by mouth daily   3/28/2017 at Unknown time     Bumetanide (BUMEX PO) Take 1 mg by mouth daily    Past Week at Unknown time     OMEPRAZOLE PO Take 20 mg by mouth every morning   3/27/2017 at Unknown time     insulin lispro (HUMALOG) 100 UNIT/ML VIAL Inject 10 Units Subcutaneous 3 times daily (before meals) TID before meals,  or less 10 units; -150 12 units; -200 12 units; -250 13 units; -300 14 units; -350 15 units; -400 16 units   3/28/2017 at Unknown time     Sertraline HCl (ZOLOFT PO) Take 100 mg by mouth daily    3/28/2017 at Unknown time             Physical Exam:   Blood pressure 145/62, temperature 97.5  F (36.4  C), temperature source Axillary, resp. rate 16, weight 92.5 kg (203 lb 14.8 oz), SpO2 97 %.  GENERAL: Lethargic, arouses to voice, oriented x0.  HEENT: Anicteric sclera. Mucous membranes moist and without lesions.   CV: RRR. S1, S2. No murmurs appreciated.   RESPIRATORY: Effort normal on RA. Lungs CTAB with no wheezing, rales, rhonchi.   GI: Abdomen soft and non distended, bowel sounds present. No tenderness, rebound, guarding.   MUSCULOSKELETAL: No joint swelling or tenderness. Moves all  extremities.   NEUROLOGICAL: No focal deficits.   EXTREMITIES: No peripheral edema. Intact bilateral pedal pulses.   SKIN: No jaundice. No rashes.     Lines/Tubes/Drains:   Peripheral IV 03/28/17 Left Lower forearm (Active)   Number of days:0            Data:   I personally reviewed the following studies:  CMP, lactic acid, Ammonia, glucose, CBC, INR  Unresulted Labs Ordered in the Past 30 Days of this Admission     Date and Time Order Name Status Description    3/28/2017 1255 Blood culture In process     3/28/2017 1255 Blood culture In process         CXR 03/282/2017  1. Unchanged moderate left-sided pleural effusion with overlying  atelectasis or consolidation.  2. Diffuse hazy opacities consistent with pulmonary edema versus  infection.

## 2017-03-28 NOTE — IP AVS SNAPSHOT
[address] [address]    Phone:  [phone]                                       After Visit Summary   3/28/2017    Ursula Crockett    MRN: [id number]           After Visit Summary Signature Page     I have received my discharge instructions, and my questions have been answered. I have discussed any challenges I see with this plan with the nurse or doctor.    ..........................................................................................................................................  Patient/Patient Representative Signature      ..........................................................................................................................................  Patient Representative Print Name and Relationship to Patient    ..................................................               ................................................  Date                                            Time    ..........................................................................................................................................  Reviewed by Signature/Title    ...................................................              ..............................................  Date                                                            Time

## 2017-03-28 NOTE — IP AVS SNAPSHOT
Ursula Crockett #[id number] (CSN: [id number])  (66 year old F)  (Adm: 17)     [id number]               [address] [place] [address]: [phone]            Patient Demographics     Patient Name Sex          Age SSN Address Phone    Ursula Crockett Female 1950 (66 year old) [id number]  [place] [facility] [address] [phone] (Home)  [phone] (Mobile)      Emergency Contact(s)     Name Relation Home Work Mobile    Savita David Daughter   [phone]    Vahe Velez Son [phone]        Admission Information     Attending Provider Admitting Provider Admission Type Admission Date/Time    Kunal Owen MD Kirsch, Esa Starks MD Emergency 17  1220    Discharge Date Hospital Service Auth/Cert Status Service Area     Internal Medicine [facility]    Unit Room/Bed Admission Status        U [id number] Admission (Confirmed)       Admission     Complaint    Hepatic encephalopathy (H)      Hospital Account     Name Acct ID Class Status Primary Coverage    Ursula Crockett [id number] Inpatient Open MEDICARE - MEDICARE            Guarantor Account (for Hospital Account #[id number])     Name Relation to Pt Service Area Active? Acct Type    Ursula Crockett Self FCS Yes Personal/Family    Address Phone          [address]  [place] [facility] [place] [address] [phone](H)              Coverage Information (for Hospital Account #[id number])     1. MEDICARE/MEDICARE     F/O Payor/Plan Precert #    MEDICARE/MEDICARE     Subscriber Subscriber #    Ursula Crockett [id number]    Address Phone    ATTN CLAIMS  [address]  [facility] [place] [address] [phone]          2. HE[employer] PARTNERS/HE[employer] FEDERAL     F/O Payor/Plan Precert #    HEALTH PARTNERS/HE[employer]NERS FEDERAL     Subscriber Subscriber #    Ursula Crockett [id number]    Address Phone    [address]  [place] [id number] [phone]                                               "        INTERAGENCY TRANSFER FORM - PHYSICIAN ORDERS   3/28/2017                       [address] [facility] BANK: [phone]            Attending Provider: Kunal Owen MD     Allergies:  Iodine, Talwin [Pentazocine], Contrast Dye, Eggs [Chicken-derived Products (Egg)], Tape [Adhesive Tape], Codeine, Penicillins    Infection:  None   Service:  INTERNAL MED    Ht:  1.803 m (5' 11\")   Wt:  85.7 kg (189 lb)   Admission Wt:  92.5 kg (203 lb 14.8 oz)    BMI:  26.36 kg/m 2   BSA:  2.07 m 2            ED Clinical Impression     Diagnosis Description Comment Added By Time Added    Hepatic encephalopathy (H) [K72.90] Hepatic encephalopathy (H) [K72.90]  Priya Allen MD 3/28/2017  2:18 PM    Altered mental status, unspecified altered mental status type [R41.82] Altered mental status, unspecified altered mental status type [R41.82]  Priya Allen MD 3/28/2017  2:18 PM      Hospital Problems as of 3/31/2017              Priority Class Noted POA    Hepatic encephalopathy (H) Medium  3/28/2017 Yes      Non-Hospital Problems as of 3/31/2017              Priority Class Noted    Other cirrhosis of liver (H) Medium  8/26/2016    S/P TIPS (transjugular intrahepatic portosystemic shunt) Medium  11/18/2016    Liver cirrhosis secondary to HEADLEY (H) Medium  1/12/2017    ACP (advance care planning)   3/28/2017      Code Status History     Date Active Date Inactive Code Status Order ID Comments User Context    3/31/2017 12:42 PM  DNR/DNI [id number]  Rosa Dunne PA-C Outpatient    3/28/2017  8:13 PM 3/31/2017 12:42 PM DNR/DNI [id number]  Xiomara Zacarias PA-C Inpatient    3/28/2017  4:28 PM 3/28/2017  8:13 PM DNR/DNI [id number]  Xiomara Zacarias PA-C ED    11/22/2016  7:23 AM 3/28/2017  4:28 PM Full Code [id number]  Kait Brink DO Outpatient    11/18/2016  4:07 PM 11/22/2016  7:23 AM Full Code [id number]  Grisel Dias NP Inpatient      Current Code Status     Date Active Code Status Order ID Comments " User Context       Prior      Summary of Visit     Reason for your hospital stay       Ursula Crockett is a 66 year old female with a past medical history of DM2, renal cancer s/p left nephrectomy, CKD stage 3 (of single kidney), 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 She was treated with lactulose protocol and cleared quickly. She was also found to have  UTI with enterococcus, only sensitive to vancomycin. She was discharged back to nursing facility on 3/31 with plan to continue strict lactulose with goal of 4-5 bowel movements per day and to continue IV vancomycin x 6 more days to complete a 7d course.                Medication Review      START taking        Dose / Directions Comments    vancomycin 1,250 mg   Indication:  Urinary Tract Infection   Used for:  Acute cystitis without hematuria        Dose:  1250 mg   Inject 1,250 mg into the vein every 24 hours for 6 days   Refills:  0          CONTINUE these medications which may have CHANGED, or have new prescriptions. If we are uncertain of the size of tablets/capsules you have at home, strength may be listed as something that might have changed.        Dose / Directions Comments    * lactulose 10 GM/15ML solution   Commonly known as:  CHRONULAC   This may have changed:  Another medication with the same name was added. Make sure you understand how and when to take each.        Dose:  20 g   Take 30 mLs (20 g) by mouth 3 times daily   Refills:  0    Hold third dose if patient has already had 4-5 bowel movements since 0600 that day.       * lactulose 10 GM/15ML solution   Commonly known as:  CHRONULAC   This may have changed:  You were already taking a medication with the same name, and this prescription was added. Make sure you understand how and when to take each.        Dose:  20 g   Take 30 mLs (20 g) by mouth daily as needed (If at 2000 daily, patient has not had at  least 4BMs that day, then give 1 time prn dose)   Refills:  0        * Notice:  This list has 2 medication(s) that are the same as other medications prescribed for you. Read the directions carefully, and ask your doctor or other care provider to review them with you.      CONTINUE these medications which have NOT CHANGED        Dose / Directions Comments    * BUMEX PO        Dose:  1 mg   Take 1 mg by mouth daily   Refills:  0        * BUMETANIDE PO        Dose:  0.5 mg   Take 0.5 mg by mouth every evening   Refills:  0        insulin glargine 100 UNIT/ML injection   Commonly known as:  LANTUS   Indication:  AT BEDTIME        Dose:  32 Units   Inject 32 Units Subcutaneous 2 times daily   Refills:  0        insulin lispro 100 UNIT/ML injection   Commonly known as:  HumaLOG   Indication:  Pt uses sliding scale   Used for:  Cirrhosis of liver with ascites, unspecified hepatic cirrhosis type (H)        Dose:  10 Units   Inject 10 Units Subcutaneous 3 times daily (before meals) TID before meals,  or less 10 units; -150 12 units; -200 12 units; -250 13 units; -300 14 units; -350 15 units; -400 16 units   Refills:  0        OMEPRAZOLE PO        Dose:  20 mg   Take 20 mg by mouth every morning   Refills:  0        RIFAXIMIN PO        Dose:  550 mg   Take 550 mg by mouth 2 times daily   Refills:  0        SPIRONOLACTONE PO        Dose:  100 mg   Take 100 mg by mouth daily   Refills:  0        ZOLOFT PO   Used for:  Cirrhosis of liver with ascites, unspecified hepatic cirrhosis type (H)        Dose:  100 mg   Take 100 mg by mouth daily   Refills:  0        * Notice:  This list has 2 medication(s) that are the same as other medications prescribed for you. Read the directions carefully, and ask your doctor or other care provider to review them with you.            After Care     Activity - Up with nursing assistance           Additional Discharge Instructions       Please follow bowel  plan:  Scheduled lactulose 20g three times daily - hold third dose if patient has already had 4 bowel movements since 0600 that day  If by 2000 each day, if patient has not had 4 bowel movements, will need an extra 20g of po lactulose       Advance Diet as Tolerated       Follow this diet upon discharge:       Moderate Consistent CHO Diet       General info for SNF       Length of Stay Estimate: Long Term Care  Condition at Discharge: Improving  Level of care:skilled   Rehabilitation Potential: Fair  Admission H&P remains valid and up-to-date: Yes  Recent Chemotherapy: N/A  Use Nursing Home Standing Orders: Yes       IV access       PICC.       Mantoux instructions       Give two-step Mantoux (PPD) Per Facility Policy Yes             Referrals     Occupational Therapy Adult Consult       Evaluate and treat as clinically indicated.    Reason:  Continue with therapies for deconditioning       Physical Therapy Adult Consult       Evaluate and treat as clinically indicated.    Reason:  Continue with therapies for deconditioning             Follow-Up Appointment Instructions     Follow Up and recommended labs and tests       Needs to have vancomycin level checked 4/2 and dose adjusted per pharmacy if high  Needs to have BMP check on 4/2 to monitor creatinine while on vancomycin  Needs to have CBC, CMP checked on 4/3  Follow up with primary care provider in 1-2 weeks time for routine follow up             Your next 10 appointments already scheduled     Apr 04, 2017  7:45 AM CDT   Lab with  LAB   [facility] ([facility])    [address]  1st Floor  [facility] [id number] [phone]            Apr 04, 2017  8:50 AM CDT   (Arrive by 8:35 AM)   Return General Liver with Crystal Mckeon MD   [facility] ([facility])    [address]  3rd Floor  [facility] [id number] [phone]            Jun 12, 2017  7:00 AM CDT   US  ABDOMEN/PELVIS DUPLEX COMPLETE with UCUS1   [facility] ([facility])    [address] [place] [id number] [phone]           Please bring a list of your medicines (including vitamins, minerals and over-the-counter drugs). Also, tell your doctor about any allergies you may have. Wear comfortable clothes and leave your valuables at home.  Adults: No eating or drinking for 8 hours before the exam. You may take medicine with a small sip of water.  Children: - Children 6+ years: No food or drink for 6 hours before exam. - Children 1-5 years: No food or drink for 4 hours before exam. - Infants, breast-fed: may have breast milk up to 2 hours before exam. - Infants, formula: may have bottle until 4 hours before exam.  Please call the Imaging Department at your exam site with any questions.            Jun 12, 2017  8:00 AM CDT   LAB with  LAB   [facility] ([facility])    [address] [place] [address] [phone]           Patient must bring picture ID.  Patient should be prepared to give a urine specimen  Please do not eat 10-12 hours before your appointment if you are coming in fasting for labs on lipids, cholesterol, or glucose (sugar).  Pregnant women should follow their Care Team instructions. Water with medications is okay. Do not drink coffee or other fluids.   If you have concerns about taking  your medications, please ask at office or if scheduling via [employer], send a message by clicking on Secure Messaging, Message Your Care Team.            Jun 12, 2017  8:20 AM CDT   (Arrive by 8:05 AM)   Return Vascular Visit with Dandre Haddad MD   [facility] ([facility])    [address] [place] [id number] [phone]              Statement of Approval     Ordered          03/31/17 1242  I have reviewed and agree with all  "the recommendations and orders detailed in this document.  EFFECTIVE NOW     Approved and electronically signed by:  Rosa Dunne PA-C                                                 INTERAGENCY TRANSFER FORM - NURSING   3/28/2017                       [address] [facility]: [phone]            Attending Provider: Kunal Owen MD     Allergies:  Iodine, Talwin [Pentazocine], Contrast Dye, Eggs [Chicken-derived Products (Egg)], Tape [Adhesive Tape], Codeine, Penicillins    Infection:  None   Service:  INTERNAL MED    Ht:  1.803 m (5' 11\")   Wt:  85.7 kg (189 lb)   Admission Wt:  92.5 kg (203 lb 14.8 oz)    BMI:  26.36 kg/m 2   BSA:  2.07 m 2            Advance Directives        Does patient have a scanned Advance Directive/ACP document in EPIC?           Yes        Immunizations     Name Date      Pneumococcal 23 valent defer-03/31/17     Deferral:         ASSESSMENT     Discharge Profile Flowsheet     EXPECTED DISCHARGE     All Quadrants Palpation  soft/nontender 03/31/17 0250    Expected Discharge Date  -- (TCU) 03/31/17 0819   Last Bowel Movement  03/31/17 03/31/17 1402    DISCHARGE NEEDS ASSESSMENT     GI Signs/Symptoms  abdominal discomfort 03/31/17 1402    Equipment Currently Used at Home  walker, rolling;grab bar (built in shower chair) 03/30/17 1651   COMMUNICATION ASSESSMENT      Transportation Available  family or friend will provide 03/30/17 1651   Patient's communication style  spoken language (English or Bilingual) 03/28/17 1211    FUNCTIONAL LEVEL CURRENT     SKIN      Ambulation  4-->completely dependent 03/29/17 1010   Inspection  Full 03/31/17 1402    Transferring  4-->completely dependent 03/29/17 1010   Skin WDL  ex 03/31/17 1402    Toileting  4-->completely dependent 03/29/17 1010   Skin Color/Characteristics  bruised (ecchymotic);redness blanchable 03/31/17 1402    Bathing  4-->completely dependent 03/29/17 1010   Skin Temperature  warm 03/31/17 1402    Dressing  4-->completely " "dependent 03/29/17 1010   Skin Moisture  dry 03/31/17 1402    Communication  2-->difficulty understanding and speaking (not related to language barrier) 03/29/17 1010   Skin Elasticity  quick return to original state 03/30/17 0416    Change in Functional Status Since Onset of Current Illness/Injury  yes 03/29/17 1010   Skin Integrity  bruise(s);drain/device(s) 03/31/17 1402    GASTROINTESTINAL (ADULT,PEDIATRIC,OB)     SAFETY      GI WDL  WDL 03/31/17 1402   Safety WDL  WDL 03/31/17 1402    Abdominal Appearance  flat;nondistended 03/30/17 1227   Safety Factors  bed in low position;wheels locked;call light in reach;upper side rails raised x 2;ID band on 03/30/17 1649    All Quadrants Bowel Sounds  audible and active in all quadrants 03/30/17 1227                      Assessment WDL (Within Defined Limits) Definitions           Safety WDL     Effective: 09/28/15    Row Information: <b>WDL Definition:</b> Bed in low position, wheels locked; call light in reach; upper side rails up x 2; ID band on<br> <font color=\"gray\"><i>Item=AS safety wdl>>List=AS safety wdl>>Version=F14</i></font>      Skin WDL     Effective: 09/28/15    Row Information: <b>WDL Definition:</b> Warm; dry; intact; elastic; without discoloration; pressure points without redness<br> <font color=\"gray\"><i>Item=AS skin wdl>>List=AS skin wdl>>Version=F14</i></font>      Vitals     Vital Signs Flowsheet     VITAL SIGNS     EKG MONITORING      Temp  97.4  F (36.3  C) 03/31/17 1421   Cardiac Regularity  Regular 03/28/17 1218    Temp src  Oral 03/31/17 1421   Cardiac Rhythm  NSR 03/28/17 1218    Resp  18 03/31/17 1421   CADENCE COMA SCALE      Heart Rate  75 03/31/17 1421   Best Eye Response  4-->(E4) spontaneous 03/30/17 0054    Pulse/Heart Rate Source  Monitor 03/31/17 1421   Best Motor Response  6-->(M6) obeys commands 03/30/17 0054    BP  125/50 03/31/17 1421   Best Verbal Response  4-->(V4) confused 03/30/17 0054    BP Location  Right arm 03/31/17 1421   " Melissa Coma Scale Score  14 03/30/17 0054    OXYGEN THERAPY     POSITIONING      SpO2  95 % 03/31/17 1421   Body Position  supine, head elevated 03/31/17 1402    O2 Device  None (Room air) 03/31/17 1421   Head of Bed (HOB)  HOB at 30-45 degrees 03/31/17 1402    PAIN/COMFORT     Positioning/Transfer Devices  pillows 03/29/17 0803    Patient Currently in Pain  denies 03/30/17 1650   Chair  -- 03/30/17 1649    Preferred Pain Scale  CAPA (Clinically Aligned Pain Assessment) ([facility] Adults Only) 03/30/17 1650   DAILY CARE      CLINICALLY ALIGNED PAIN ASSESSMENT (CAPA) ([facility] ADULTS ONLY)     Activity Type  activity adjusted per tolerance 03/31/17 0250    Comfort  negligible pain 03/30/17 1650   Activity Level of Assistance  assistance, 1 person 03/31/17 0250    Change in Pain  getting better 03/29/17 1824   ECG      Pain Control  fully effective 03/29/17 1824   ECG Rhythm  Normal sinus rhythm 03/30/17 1650    Functioning  can do everything I need to 03/29/17 1824   Ectopy  None 03/30/17 1650    Sleep  normal sleep 03/29/17 1824   POINT OF CARE TESTING      HEIGHT AND WEIGHT     Puncture Site  fingertip 03/30/17 1702    Weight  85.7 kg (189 lb) 03/30/17 1828   Bedside Glucose (mg/dl )   436 mg/dl 03/30/17 1702            Patient Lines/Drains/Airways Status    Active LINES/DRAINS/AIRWAYS     Name: Placement date: Placement time: Site: Days: Last dressing change:    Incision/Surgical Site 11/18/16 Right;Lower Abdomen 11/18/16       133     Incision/Surgical Site 11/18/16 Right Neck 11/18/16       133             Patient Lines/Drains/Airways Status    Active PICC/CVC     Name: Placement date: Placement time: Site: Days: Additional Info Last dressing change:    PICC Single Lumen 03/31/17 Left Brachial vein medial 03/31/17   1300   Brachial vein medial   less than 1 External Cath Length (cm): 4 cm            Size (Fr): 4 Fr            Orientation: Left            Extremity  Circumference (cm): 32 cm            Catheter Brand: Intellinoteilyst            Dressing Intervention: Chlorhexidine patch;Transparent;Securing device;New dressing;Other (Comment)            Description: Valved;Power PICC            Total Catheter Length (cm) Trimmed: 46 cm            Site Prep: Chlorhexidine            Local Anesthetic: Injectable            Inserted by: Michela Shah RN [facility]            Insertion attempts with ultrasound: 1            Patient Tolerance: Tolerated well            Placement Verification: Blood Return;Flouroscopy            Difficulty with threading line: No            Tip location: SVC            Full barrier precautions done: Yes            Consent Signed: Yes            Time Out performed: Yes            Lot #: [id number]            Use for : Antibiotics. Rosa 5B RN, notified that PICC is okay to use.               Intake/Output Detail Report     Date Intake     Output   Net    Shift P.O. I.V. IV Piggyback Total Urine Stool Total       Day 03/30/17 0000 - 03/30/17 0659 400 -- -- 400 400 -- 400 0    Veena 03/30/17 0700 - 03/30/17 1459 -- -- -- -- -- -- -- 0    Noc 03/30/17 1500 - 03/30/17 2359 600 10 -- 610 -- -- -- 610    Day 03/31/17 0000 - 03/31/17 0659 -- -- -- -- -- -- -- 0    Veena 03/31/17 0700 - 03/31/17 1459 -- -- -- -- -- -- -- 0      Last Void/BM       Most Recent Value    Urine Occurrence 1 at 03/31/2017 0800    Stool Occurrence 1 at 03/31/2017 0800      Case Management/Discharge Planning     Case Management/Discharge Planning Flowsheet     LIVING ENVIRONMENT     MH/BH CAREGIVER      Lives With  facility resident 03/30/17 1249   Filed Complexity Screen Score  7 03/29/17 0836    Living Arrangements  house (1 level) 03/30/17 1651   ABUSE RISK SCREEN      COPING/STRESS     QUESTION TO PATIENT:  Has a member of your family or a partner(now or in the past) intimidated, hurt, manipulated, or controlled you in any way?  no 03/28/17 1213    Major Change/Loss/Stressor   hospitalization;illness;medical condition/diagnosis 03/29/17 1012   QUESTION TO PATIENT: Do you feel safe going back to the place where you are living?  yes 03/28/17 1213    EXPECTED DISCHARGE     OBSERVATION: Is there reason to believe there has been maltreatment of a vulnerable adult (ie. Physical/Sexual/Emotional abuse, self neglect, lack of adequate food, shelter, medical care, or financial exploitation)?  no 03/28/17 1213    Expected Discharge Date  -- (TCU) 03/31/17 0819   (R) MENTAL HEALTH SUICIDE RISK      DISCHARGE PLANNING     Are you depressed or being treated for depression?  No 03/29/17 1013    Transportation Available  family or friend will provide 03/30/17 1651   HOMICIDE RISK      FINAL RESOURCES     Homicidal Ideation  no 03/28/17 1213    Equipment Currently Used at Home  walker, rolling;grab bar (built in shower chair) 03/30/17 1651                       [address] [facility] BANK: [phone]            Medication Administration Report for [place] Urs[place] PANCHAL as of 03/31/17 1451   Legend:    Given Hold Not Given Due Canceled Entry Other Actions    Time Time (Time) Time  Time-Action       Inactive    Active    Linked        Medications 03/25/17 03/26/17 03/27/17 03/28/17 03/29/17 03/30/17 03/31/17    acetaminophen (TYLENOL) tablet 650 mg  Dose: 650 mg Freq: EVERY 8 HOURS PRN Route: PO  PRN Reason: mild pain  Start: 03/28/17 2013   Admin Instructions: Alternate ibuprofen (if ordered) with acetaminophen.  Maximum acetaminophen dose from all sources = 75 mg/kg/day not to exceed 4 grams/day.               bumetanide (BUMEX) tablet 1 mg  Dose: 1 mg Freq: DAILY Route: PO  Start: 03/31/17 1000          (1313)-Not Given           Daily 2 GRAM acetaminophen limit, unless fulminent liver failure or transaminases greater than or equal to 300 - 400, then none  Freq: CONTINUOUS PRN Route: XX  Start: 03/28/17 1615   Admin Instructions: Daily 2 GRAM acetaminophen limit, unless fulminent liver failure or transaminases  greater than or equal to 300 - 400, then none               dextrose 10 % 1,000 mL infusion  Freq: CONTINUOUS PRN Route: IV  PRN Comment: Give if on IV Insulin Infusion, and Parenteral or Enteral nutrition held or cycled off.   Start: 03/30/17 1504   Admin Instructions: Infuse IV D10W at TPN/TF rate whenever nutrition is held or cycled off.               glucose 40 % gel 15-30 g  Dose: 15-30 g Freq: EVERY 15 MIN PRN Route: PO  PRN Reason: low blood sugar  Start: 03/30/17 1504   Admin Instructions: Give 15 g for BG 51 to 69 mg/dL IF patient is conscious and able to swallow. Give 30 g for BG less than or equal to 50 mg/dL IF patient is conscious and able to swallow. Do NOT give glucose gel via enteral tube.  IF patient has enteral tube: give apple juice 120 mL (4 oz or 15 g of CHO) via enteral tube for BG 51 to 69 mg/dL.  Give apple juice 240 mL (8 oz or 30 g of CHO) via enteral tube for BG less than or equal to 50 mg/dL.              Or  dextrose 50 % injection 25-50 mL  Dose: 25-50 mL Freq: EVERY 15 MIN PRN Route: IV  PRN Reason: low blood sugar  Start: 03/30/17 1504   Admin Instructions: Use if have IV access, BG less than 70 mg/dL and meet dose criteria below:  Dose if conscious and alert (or disorientated) and NPO = 25 mL  Dose if unconscious / not alert = 50 mL  Vesicant.              Or  glucagon injection 1 mg  Dose: 1 mg Freq: EVERY 15 MIN PRN Route: SC  PRN Reason: low blood sugar  PRN Comment: May repeat x 1 only  Start: 03/30/17 1504   Admin Instructions: May give SQ or IM. IF BG less than or equal to 50 mg/dL and no IV access.  ONLY use glucagon IF patient has NO IV access AND is UNABLE to swallow.               heparin lock flush 10 UNIT/ML injection 2-5 mL  Dose: 2-5 mL Freq: ONCE PRN Route: IK  PRN Reason: line flush  PRN Comment: for locking each dormant lumen with line placement  Start: 03/31/17 0840   Admin Instructions: May repeat x 1               insulin 1 unit/mL in saline (NovoLIN, HumuLIN  Regular) drip - ADULT IV Infusion  Rate: 0-24 mL/hr Freq: CONTINUOUS Route: IV  Last Dose: 0.4 Units/hr (03/31/17 0936)  Start: 03/30/17 1515   Admin Instructions: Initiate drip with Algorithm #1 (see hyperlink to protocol). Start protocol only if glucose greater than 150 mg/dL. Maintain glucose level between 100-150 mg/dL.  Discontinue when glycemic control achieved and transitioning to SQ insulin, or Insulin therapy no longer required. When blood glucose has stabilized and patient is tolerating PO intake, call provider for transition to SQ insulin.  For Infusion Instructions, Click on ADULT DRIP PROTOCOL hyperlink below.          1611 (5.5 Units/hr)-New Bag       1753 (12 Units/hr)-New Bag       1857 (17.5 Units/hr)-New Bag       1952 (17.5 Units/hr)-New Bag       2007 (14.5 Units/hr)-Rate/Dose Change       2114 (7 Units/hr)-Rate/Dose Change       2210 (3 Units/hr)-Rate/Dose Change       2211 (3 Units/hr)-New Bag       2255 (2 Units/hr)-Rate/Dose Change        0022 (2 Units/hr)-Rate/Dose Verify [C]       0109 (1 Units/hr)-Rate/Dose Change [C]       0205 (2 Units/hr)-Rate/Dose Change [C]       0300 (3 Units/hr)-Rate/Dose Change [C]       0407 (2 Units/hr)-Rate/Dose Change [C]       0508 (1 Units/hr)-Rate/Dose Change [C]       0612 (1 Units/hr)-Rate/Dose Verify [C]       0701 (1 Units/hr)-Rate/Dose Verify [C]       0858 (0.2 Units/hr)-Rate/Dose Change       0936 (0.4 Units/hr)-Rate/Dose Change           insulin aspart (NovoLOG) inj (RAPID ACTING)  Dose: 10-16 Units Freq: 3 TIMES DAILY BEFORE MEALS Route: SC  Indications Comment: Pt uses sliding scale  Start: 03/31/17 0763   Admin Instructions: TID before meals,  or less 10 units; -150 12 units; -200 12 units; -250 13 units; -300 14 units; -350 15 units; -400 16 units<br><br>Therapeutic interchange for insulin lispro (Humalog).  Please resume Humalog at discharge.  If given at mealtime, must be administered 5 min before meal  "or immediately after.           0936 (12 Units)-Given       (1313)-Not Given       [ ] 1700           insulin glargine (LANTUS) injection 32 Units  Dose: 32 Units Freq: 2 TIMES DAILY Route: SC  Start: 03/31/17 0800          0858 (32 Units)-Given       [ ] 2000           lactulose (CHRONULAC) solution 20 g  Dose: 20 g Freq: 3 TIMES DAILY Route: PO  Start: 03/28/17 2015       (2039)-Not Given [C]        0838 (20 g)-Given       (1358)-Not Given       2001 (20 g)-Given        0832 (20 g)-Given       1337 (20 g)-Given       1953 (20 g)-Given        0900 (20 g)-Given       (1417)-Not Given       [ ] 2000           lidocaine (LMX4) kit  Freq: ONCE PRN Route: Top  PRN Reason: moderate pain  PRN Comment: for local anesthetic during PICC insertion  Start: 03/31/17 0825   Admin Instructions: Apply to PICC Insertion Site. Apply 30 minutes prior to procedure. MAX Dose: 2.5 gm  (1/2 of 5 gm tube)                 lidocaine (LMX4) kit  Freq: EVERY 1 HOUR PRN Route: Top  PRN Reason: pain  PRN Comment: with VAD insertion or accessing implanted port.  Start: 03/28/17 2013   Admin Instructions: Do NOT give if patient has a history of allergy to any local anesthetic or any \"desmond\" product.   Apply 30 minutes prior to VAD insertion or port access.  MAX Dose:  2.5 g (  of 5 g tube)               lidocaine 1 % 1 mL  Dose: 1 mL Freq: EVERY 1 HOUR PRN Route: OTHER  PRN Comment: mild pain with VAD insertion or accessing implanted port  Start: 03/28/17 2013   Admin Instructions: Do NOT give if patient has a history of allergy to any local anesthetic or any \"desmond\" product. MAX dose 1 mL subcutaneous OR intradermal in divided doses.               naloxone (NARCAN) injection 0.1-0.4 mg  Dose: 0.1-0.4 mg Freq: EVERY 2 MIN PRN Route: IV  PRN Reason: opioid reversal  Start: 03/28/17 2013   Admin Instructions: For respiratory rate LESS than or EQUAL to 8.  Partial reversal dose:  0.1 mg titrated q 2 minutes for Analgesia Side Effects Monitoring " Sedation Level of 3 (frequently drowsy, arousable, drifts to sleep during conversation).Full reversal dose:  0.4 mg bolus for Analgesia Side Effects Monitoring Sedation Level of 4 (somnolent, minimal or no response to stimulation).               ondansetron (ZOFRAN-ODT) ODT tab 4 mg  Dose: 4 mg Freq: EVERY 6 HOURS PRN Route: PO  PRN Reason: nausea  Start: 03/28/17 2013   Admin Instructions: This is Step 1 of nausea and vomiting management.  If nausea not resolved in 15 minutes, go to Step 2 prochlorperazine (COMPAZINE). Do not push through foil backing. Peel back foil and gently remove. Place on tongue immediately. Administration with liquid unnecessary              Or  ondansetron (ZOFRAN) injection 4 mg  Dose: 4 mg Freq: EVERY 6 HOURS PRN Route: IV  PRN Reasons: nausea,vomiting  Start: 03/28/17 2013   Admin Instructions: This is Step 1 of nausea and vomiting management.  If nausea not resolved in 15 minutes, go to Step 2 prochlorperazine (COMPAZINE).  Irritant.               pantoprazole (PROTONIX) EC tablet 40 mg  Dose: 40 mg Freq: 2 TIMES DAILY BEFORE MEALS Route: PO  Start: 03/30/17 1630   Admin Instructions: DO NOT CRUSH.   Changed to po per pharmacy iv to po protocol          1621 (40 mg)-Given        0858 (40 mg)-Given       [ ] 1630           pneumococcal vaccine (PNEUMOVAX 23-reyna) injection 0.5 mL  Dose: 0.5 mL Freq: PRIOR TO DISCHARGE Route: IM  Start: 03/31/17 1000   Admin Instructions: Administer when afebrile (less than 100.4 ) x 24 hr OR prior to discharge. *Give Fact Sheet to Patient*. If patient is febrile, reassess in 8 hrs or every shift. Minor illnesses with or without fever does NOT contraindicate the vaccination. If not administering when scheduled , change the due time by following the instructions in the reference link below. If patient refuses vaccine, chart as Vaccine Refused.           (3769)-Vaccine Refused           polyethylene glycol (MIRALAX/GLYCOLAX) Packet 17 g  Dose: 17 g Freq:  DAILY PRN Route: PO  PRN Reason: constipation  Start: 03/28/17 2013   Admin Instructions: Give in 8oz of  water, juice, or soda. Hold for loose stools.  This is the second step of a three step constipation treatment protocol.  1 Packet = 17 grams. Mixed prescribed dose in 8 ounces of water. Follow with 8 oz. of water.               rifaximin (XIFAXAN) tablet 550 mg  Dose: 550 mg Freq: 2 TIMES DAILY Route: PO  Indications Comment: Intestinal bacteria reduction in the treament of Hepatic Encephalopathy.  Start: 03/28/17 2000   Admin Instructions: May give per NG if patient unable to take PO        (2103)-Not Given [C]        (0840)-Not Given       2003 (550 mg)-Given        0833 (550 mg)-Given       1953 (550 mg)-Given        0858 (550 mg)-Given       [ ] 2000           sodium chloride (PF) 0.9% PF flush 10 mL  Dose: 10 mL Freq: EVERY 7 DAYS Route: IK  Start: 03/31/17 1315   Admin Instructions: And Q1H PRN, to lock each CVC - Valved (Tunneled and Non-Tunneled) dormant lumen.           (1314)-Not Given           sodium chloride (PF) 0.9% PF flush 10 mL  Dose: 10 mL Freq: EVERY 7 DAYS Route: IK  Start: 03/31/17 0830   Admin Instructions: And Q1H PRN, to lock each CVC - Valved (Tunneled and Non-Tunneled) dormant lumen.           (0927)-Not Given           sodium chloride (PF) 0.9% PF flush 10-20 mL  Dose: 10-20 mL Freq: EVERY 1 HOUR PRN Route: IK  PRN Reasons: line flush,post meds or blood draw  Start: 03/31/17 1304   Admin Instructions: to flush CVC - Valved (Tunneled and Non-Tunneled).   10 mL post IV meds; 20 mL post blood draw.               sodium chloride (PF) 0.9% PF flush 10-20 mL  Dose: 10-20 mL Freq: EVERY 1 HOUR PRN Route: IK  PRN Reasons: line flush,post meds or blood draw  Start: 03/31/17 0825   Admin Instructions: to flush CVC - Valved (Tunneled and Non-Tunneled).   10 mL post IV meds; 20 mL post blood draw.               sodium chloride (PF) 0.9% PF flush 3 mL  Dose: 3 mL Freq: EVERY 8 HOURS Route:  IK  Start: 03/28/17 2015   Admin Instructions: And Q1H PRN, to lock peripheral IV dormant line.        2039 (3 mL)-Given        0338 (3 mL)-Given       (1204)-Not Given       2003 (3 mL)-Given        0315 (3 mL)-Given       1134 (3 mL)-Given       (1953)-Not Given        0528 (3 mL)-Given       (1255)-Not Given       [ ] 2000           sodium chloride (PF) 0.9% PF flush 3 mL  Dose: 3 mL Freq: EVERY 1 HOUR PRN Route: IK  PRN Reason: line flush  PRN Comment: for peripheral IV flush post IV meds  Start: 03/28/17 2013              spironolactone (ALDACTONE) tablet 100 mg  Dose: 100 mg Freq: DAILY Route: PO  Start: 03/31/17 1000          (1313)-Not Given           thiamine tablet 100 mg  Dose: 100 mg Freq: DAILY Route: PO  Start: 03/28/17 1617                       0833 (100 mg)-Given        0858 (100 mg)-Given          Or  thiamine (B-1) injection 100 mg  Dose: 100 mg Freq: DAILY Route: IV  Start: 03/28/17 1617       1926 (100 mg)-Given        0838 (100 mg)-Given                           vancomycin (VANCOCIN) 1,250 mg in NaCl 0.9 % 250 mL intermittent infusion  Dose: 1,250 mg Freq: EVERY 24 HOURS Route: IV  Indications of Use: URINARY TRACT INFECTION  Start: 03/31/17 0900   Admin Instructions: For an adult with peripheral catheter and dose of 2-2.5 g, infuse over 2 hours.  Vesicant. IF central catheter, doses below 2 g may be given over 1 hour.           1314 (1,250 mg)-New Bag          Completed Medications  Medications 03/25/17 03/26/17 03/27/17 03/28/17 03/29/17 03/30/17 03/31/17         Dose: 200 g Freq: ONCE Route: RE  Start: 03/28/17 1424   End: 03/28/17 1518   Admin Instructions: Nursing to prepare 200g enema by mixing 300mL lactulose with 700mL saline for 1 liter enema.        1518 (200 g)-Given                Dose: 0.5-5 mL Freq: ONCE PRN Route: OTHER  PRN Comment: mild pain For local anesthetic during PICC insertion.  Start: 03/31/17 0825   End: 03/31/17 1301   Admin Instructions: Give Sub-Q/Intradermal.   Give in divided doses as needed.           1301 (2 mL)-Given             Dose: 5-50 mL Freq: ONCE PRN Route: IK  PRN Reason: line flush  PRN Comment: to flush each lumen with line placement  Start: 03/31/17 0825   End: 03/31/17 1302   Admin Instructions: May repeat x 1           1302 (10 mL)-Given [C]          Discontinued Medications  Medications 03/25/17 03/26/17 03/27/17 03/28/17 03/29/17 03/30/17 03/31/17         Dose: 15-30 g Freq: EVERY 15 MIN PRN Route: PO  PRN Reason: low blood sugar  Start: 03/30/17 1227   End: 03/30/17 1233   Admin Instructions: Give 15 g for BG 51 to 69 mg/dL IF patient is conscious and able to swallow. Give 30 g for BG less than or equal to 50 mg/dL IF patient is conscious and able to swallow. Do NOT give glucose gel via enteral tube.  IF patient has enteral tube: give apple juice 120 mL (4 oz or 15 g of CHO) via enteral tube for BG 51 to 69 mg/dL.  Give apple juice 240 mL (8 oz or 30 g of CHO) via enteral tube for BG less than or equal to 50 mg/dL.          1233-Med Discontinued       Or    Dose: 25-50 mL Freq: EVERY 15 MIN PRN Route: IV  PRN Reason: low blood sugar  Start: 03/30/17 1227   End: 03/30/17 1233   Admin Instructions: Use if have IV access, BG less than 70 mg/dL and meet dose criteria below:  Dose if conscious and alert (or disorientated) and NPO = 25 mL  Dose if unconscious / not alert = 50 mL  Vesicant.          1233-Med Discontinued       Or    Dose: 1 mg Freq: EVERY 15 MIN PRN Route: SC  PRN Reason: low blood sugar  PRN Comment: May repeat x 1 only  Start: 03/30/17 1227   End: 03/30/17 1233   Admin Instructions: May give SQ or IM. IF BG less than or equal to 50 mg/dL and no IV access.  ONLY use glucagon IF patient has NO IV access AND is UNABLE to swallow.          1233-Med Discontinued          Dose: 15-30 g Freq: EVERY 15 MIN PRN Route: PO  PRN Reason: low blood sugar  Start: 03/28/17 1652   End: 03/30/17 1511   Admin Instructions: Give 15 g for BG 51 to 69 mg/dL IF  patient is conscious and able to swallow. Give 30 g for BG less than or equal to 50 mg/dL IF patient is conscious and able to swallow. Do NOT give glucose gel via enteral tube.  IF patient has enteral tube: give apple juice 120 mL (4 oz or 15 g of CHO) via enteral tube for BG 51 to 69 mg/dL.  Give apple juice 240 mL (8 oz or 30 g of CHO) via enteral tube for BG less than or equal to 50 mg/dL.          1511-Med Discontinued       Or    Dose: 25-50 mL Freq: EVERY 15 MIN PRN Route: IV  PRN Reason: low blood sugar  Start: 03/28/17 1652   End: 03/30/17 1511   Admin Instructions: Use if have IV access, BG less than 70 mg/dL and meet dose criteria below:  Dose if conscious and alert (or disorientated) and NPO = 25 mL  Dose if unconscious / not alert = 50 mL  Vesicant.          1511-Med Discontinued       Or    Dose: 1 mg Freq: EVERY 15 MIN PRN Route: SC  PRN Reason: low blood sugar  PRN Comment: May repeat x 1 only  Start: 03/28/17 1652   End: 03/30/17 1511   Admin Instructions: May give SQ or IM. IF BG less than or equal to 50 mg/dL and no IV access.  ONLY use glucagon IF patient has NO IV access AND is UNABLE to swallow.          1511-Med Discontinued          Dose: 1-7 Units Freq: AT BEDTIME Route: SC  Start: 03/30/17 2200   End: 03/30/17 1511   Admin Instructions: HIGH INSULIN RESISTANCE DOSING    Do Not give Bedtime Correction Insulin if BG less than 200.   For  - 224 give 1 units.   For  - 249 give 2 units.   For  - 274 give 3 units.   For  - 299 give 4 units.   For  - 324 give 5 units.   For  - 349 give 6 units.   For BG greater than or equal to 350 give 7 units.   Notify provider if glucose greater than or equal to 350 mg/dL after administration of correction dose.  If given at mealtime, must be administered 5 min before meal or immediately after.          1511-Med Discontinued          Dose: 1-10 Units Freq: 3 TIMES DAILY BEFORE MEALS Route: SC  Start: 03/30/17 1230   End:  03/30/17 1511   Admin Instructions: Correction Scale - HIGH INSULIN RESISTANCE DOSING     Do Not give Correction Insulin if Pre-Meal BG less than 140.   For Pre-Meal  - 164 give 1 unit.   For Pre-Meal  - 189 give 2 units.   For Pre-Meal  - 214 give 3 units.   For Pre-Meal  - 239 give 4 units.   For Pre-Meal  - 264 give 5 units.   For Pre-Meal  - 289 give 6 units.   For Pre-Meal  - 314 give 7 units.   For Pre-Meal  - 339 give 8 units.   For Pre-Meal  - 364 give 9 units.   For Pre-Meal BG greater than or equal to 365 give 10 units  To be given with prandial insulin, and based on pre-meal blood glucose.   Notify provider if glucose greater than or equal to 350 mg/dL after administration of correction dose.  If given at mealtime, must be administered 5 min before meal or immediately after.          1338 (4 Units)-Given [C]       1511-Med Discontinued          Dose: 1-6 Units Freq: EVERY 4 HOURS Route: SC  Start: 03/28/17 1653   End: 03/30/17 1221   Admin Instructions: Correction Scale - MEDIUM INSULIN RESISTANCE DOSING     Do Not give Correction Insulin if BG less than 140.  For  - 189 give 1 unit.  For  - 239 give 2 units.  For  - 289 give 3 units.  For  - 339 give 4 units.  For  - 399 give 5 units.  For BG greater than or equal to 400 give 6 units.  Check blood glucose Q4H and administer based on blood glucose.  Notify provider if glucose greater than or equal to 350 mg/dL after administration of correction dose.  If given at mealtime, must be administered 5 min before meal or immediately after.        (1738)-Not Given       (2038)-Not Given       (2318)-Not Given [C]        (0341)-Not Given [C]       (0840)-Not Given       (1209)-Not Given       1600 (1 Units)-Given       2005 (1 Units)-Given        0057 (4 Units)-Given [C]       0315 (3 Units)-Given [C]       (0833)-Not Given [C]       1133 (6 Units)-Given       1221-Med Discontinued           Dose: 16 Units Freq: 2 TIMES DAILY Route: SC  Start: 03/30/17 1230   End: 03/30/17 1501         1338 (16 Units)-Given       1501-Med Discontinued          Dose: 20 g Freq: EVERY 2 HOURS PRN Route: ORAL OR NG T  PRN Comment: Any time symptoms of Hepatic Encephalopathy (HE) are noted, RN to implement Lactulose Nurse Initiated Protocol. If symptoms of HE recur, RN will re-implement the protocol. RN to notify primary team each time HE Nurse Initiated Protocol is implemented  Start: 03/28/17 1615   End: 03/31/17 0739   Admin Instructions: For Stage 1 symptoms: lactulose 20 g (30 mL) PO/ NG every 2 hrs x 3 doses.    For Stage 2 symptoms: lactulose 20 g (30 mL) PO/NG every 2 hrs x 3 doses or if refusing PO/NG, RECTAL (150 mL lactulose mixed with 350 mL NS/water and retained RECTALLY for 30 to 60 min via rectal tube) every 2 hrs x 3 doses.  For Stage 3 symptoms: lactulose 20 g (30 mL) PO/NG every 2 hrs x 3 doses or if refusing PO/NG, RECTAL (150 mL lactulose mixed with 350 mL NS/water and retained RECTALLY for 30 to 60 min via rectal tube) q 2 hrs x 3 doses.   For Stages 1, 2, and 3: After 6 hours of treatment, if symptoms have improved back to baseline, revert to scheduled Lactulose dose. If symptoms have remained stable or progressed, discuss with primary medical team continuing current plan until patient clears mentally.  For Stage 4 Symptoms: lactulose 20 g (30 mL) NG every 2 hours until patient clears mentally OR RECTAL (150 mL lactulose mixed with 350 mL NS/water and retained RECTALLY for 30 to 60 min via rectal tube) q 2 hrs until patient clears mentally. After 6 hours of treatment, if symptoms have remained stable, discuss with primary medical team continuing current plan until patient clears mentally.                                                                        2212 (20 g)-Given        0059 (20 g)-Given       0316 (20 g)-Given       0520 (20 g)-Given       1135 (20 g)-Given        0739-Med  Discontinued      Or    Dose: 100 g Freq: EVERY 2 HOURS PRN Route: RE  PRN Comment: Any time symptoms of HE(HE) are noted, RN to implement Lactulose Nurse Initiated Protocol. If symptoms of HE recur, RN will re-implement the protocol. RN to notify primary team each time HE Nurse Initiated Protocol is implemented.  Start: 03/28/17 1615   End: 03/31/17 0739   Admin Instructions: For Stage 2 Symptoms: lactulose 20 g (30 mL) PO/NG every 2 hrs x 3 doses or if refusing PO/NG, RECTAL (150 mL lactulose mixed with 350 mL NS/water and retained RECTALLY for 30 to 60 min via rectal tube) every 2 hrs x 3 doses.  After 6 hours of treatment, if symptoms have improved back to baseline, revert to scheduled lactulose dose. If symptoms have remained stable or progressed, discuss with primary medical team continuing current plan until patient clears mentally.  For Stage 3 Symptoms: RECTAL (150 mL lactulose mixed with 350 mL NS/water and retained RECTALLY for 30 to 60 min via rectal tube) every 2 hrs x 3 doses. If patient worsens (ex. becomes comatose) call an RRT and alert Primary team of decline of patient status, now requiring ICU monitoring. After 6 hours of treatment, if symptoms have remained stable, discuss with primary medical team continuing current plan until patient clears mentally.  For Stage 4 Symptoms: RECTAL (150 mL lactulose mixed with 350 mL NS/water and retained RECTALLY for 30 to 60 min via rectal tube) every 2 hrs until patient clears mentally OR Lactulose 20 g (30 mL) NG every 2 hours until patient clears mentally. After 6 hours of treatment, if symptoms have remained stable, discuss with primary medical team continuing current plan until patient clears mentally.        2040 (100 g)-Given       2237 (100 g)-Given        0046 (100 g)-Given       0316 (100 g)-Given       0528 (100 g)-Given       0845 (100 g)-Given       1045 (100 g)-Given       1318 (100 g)-Given       1600 (100 g)-Given                                             0739-Med Discontinued         Dose: 40 mg Freq: 2 TIMES DAILY BEFORE MEALS Route: IV  Start: 03/29/17 0730   End: 03/30/17 1217   Admin Instructions: Reconstitute vial with 10mLs Saline and administer IV Push  Irritant.         0840 (40 mg)-Given       1600 (40 mg)-Given        0833 (40 mg)-Given       1217-Med Discontinued     Medications 03/25/17 03/26/17 03/27/17 03/28/17 03/29/17 03/30/17 03/31/17               INTERAGENCY TRANSFER FORM - NOTES (H&P, Discharge Summary, Consults, Procedures, Therapies)   3/28/2017                       [address] [facility] BANK: [phone]               History & Physicals      H&P by Xiomara Zacarias PA-C at 3/28/2017  3:43 PM     Author:  Xiomara Zacarias PA-C Service:  Hospitalist Author Type:  Physician Assistant    Filed:  3/28/2017 10:25 PM Date of Service:  3/28/2017  3:43 PM Note Created:  3/28/2017  3:42 PM    Status:  Attested :  Xiomara Zacarias PA-C (Physician Assistant)    Cosigner:  Esa Manzo MD at 3/30/2017 10:38 PM        Attestation signed by Esa Manzo MD at 3/30/2017 10:38 PM        Attestation:  Physician Attestation   I, Esa Manzo, saw and evaluated Ursula Crockett as part of a shared visit.  I have reviewed and discussed with the advanced practice provider their history, physical and plan.    I personally reviewed the vital signs, medications and labs.    My key history or physical exam findings: 66F with HE. Likely med adherence.     Key management decisions made by me: lactulose. Liver. U/s with doppler to r/o clot. Consider thora to drain pleural eff.    Esa Manzo  Date of Service (when I saw the patient): 3/28/17                                 [facility] History and Physical  Department of Internal Medicine    Patient Name: Ursula Crockett MRN# [id number]   Age: 66 year old YOB: 1950     Date of Admission: 3/28/2017    Home clinic:[AB1.1]  Formerly [facility], [facility][AB1.2]  Primary care provider: Raudel Nicholson         Assessment and Plan:   Ursula Crockett is a 66 year old female with a past medical history of[AB1.1] DM2[AB1.2],[AB1.1] renal cancer s/p left nephrectomy, CKD stage 3 (of single kidney),[AB1.2] HEADLEY cirrhosis[AB1.1] c/b HE, EV s/p banding in 10/2016 and refractory ascites[AB1.2] s/p TIPS 11/2016 who is admitted for AMS, found to have HE.    #Hepatic encephalopathy: S/P TIPS in 11/2016.[AB1.1] Recent admission to [facility] 03/20-03/24 for HE (on lactulose), 02/25-02/28 for HE 2/2 noncompliance w/ lactulose.[AB1.2] PTA maintained on rifaximin 550 mg BID, lactulose 30 mg[AB1.1] TID[AB1.3] titrated to 3-5 stools/day at NH.[AB1.1] NH documentation w/o any missed doses of lactulose- but discussed with NH staff and they note that she hasn't been stooling.[AB1.2] Ammonia of 194 in ED.[AB1.1] No drugs (narcs or benzos).[AB1.2] Infx w/u w/ CXR noting moderate left sided pluaral effusion w/ overlying atelecatsis or consolidation (unchanged), diffuse hazy opacities consistent w/ pulmonary edema vs. Infection[AB1.1], UA w/[AB1.2]o evidence of infection[AB1.4], Afebrile, normal WBC count, lactic acid WNL[AB1.2].[AB1.1] Hgb stable and no evidence of GI bleed. Doesn't appear intravascularly dry. Abd US from 03/10 w/ patent tips, increased shunt velocity which could be position dependent or concerning for impending TIPS failure. K stable, Glucose normal, bicarb normal, sodium mildly elevated, BUN mildly elevated but neither likely contributing. Vital signs w/ normal BP, normal HR, RR of 12 and sating well on RA. Most likely due to constipation on incorrect lactulose dosing.[AB1.2]  -Lactulose protocol[AB1.1], rectal lactulose tonight until clears[AB1.2]  -Continue rifaximin[AB1.1]  -Complete abd US w/ dopplers ordered[AB1.2]  -[AB1.1]Para tomorrow[AB1.4]  -BC x2 in process[AB1.1]  -Consider discussing TIPS w/ IR pending US[AB1.2]    #HEADLEY cirrhosis: Follows  w/ Dr. Mckeon. C/B EV s/p banding x7 10/16, portal hypertensive gastropathy, ascites s/p TIPS, requires nora once/3 weeks w/ 2 L[AB1.1] ascitic fluid[AB1.2] off. On bumex 1 mg qam, 0.5 mg qpm,[AB1.1] aldactone 100 mg qam, 50 mg qafternoon,[AB1.3]lactulose and rifaximin as above[AB1.2] PTA.[AB1.1] Platelets stable, bili stable, INR stable at 1.21.[AB1.2]  MELD-Na score: 15 at 3/28/2017 12:17 PM  MELD score: 15 at 3/28/2017 12:17 PM  Calculated from:  Serum Creatinine: 1.88 mg/dL at 3/28/2017 12:17 PM  Serum Sodium: 145 mmol/L (Rounded to 137) at 3/28/2017 12:17 PM  Total Bilirubin: 0.9 mg/dL (Rounded to 1) at 3/28/2017 12:17 PM  INR(ratio): 1.21 at 3/28/2017 12:17 PM  Age: 66 years[AB1.5]  -Hold diuretics w/ NPO status and HE  -Could use IV lasix PRN for hypervolemia  -Lactluose protocol as above[AB1.1]  -Had previously been on daily Cipro for ? Of SBP prophylaxis vs UTI prophylaxis, no diagnostic taps at this facility (diagnostic para 04/18/2016 w/ 312 nucleated cells, only 3% neutrophils)    #Left sided pleural effusion: Present since 11/2016. ECHO 03/2017 at  w/ normal LV EF at 60% but grade II diastolic dysfunction, normal RV size and function, no valvular abnormalities. Refused thora at [facility] recently. Most likely 2/2 ascites and HEADLEY cirrhosis. Not requiring O2.   -Monitor O2 sats  -Consider thora pending symptoms  -Hold diuresis tonight while NPO[AB1.2]    #DM2: On Lantus 32 units BID, humalog[AB1.1] 5-12[AB1.2] unit[AB1.1] custom[AB1.2] subq TID[AB1.1] AC[AB1.2].[AB1.1] Hgb A1C 6.9 in 04/2016.  today.  -Q4 hour BG checks  -High insulin resistance SSI  -Half dose of lantus while NPO and not tolerating oral intake  -Hypoglycemia protocol[AB1.2]    #CKD stage 3: Baseline creat ~1.5 since 10/2016, admitted w/ creat of 1.88, elevated BUN to 34.[AB1.1] C[AB1.2]reat[AB1.1] 1.74 on recent admission to  03/24. Of note patient has single kidney as she is s/p left nephrectomy in 2011.   -Hold  "diuretics  -BMP qday  -Avoid nephrotoxics  -UA    #Normocytic anemia: Hgb of 10.1 on admission which is baseline. Normal MCV. Iron studies in 04/2016 w/ low iron sat, normal IBC, normal iron. Likely due to liver disease, CKD.  -Monitor qday[AB1.2]    CODE:[AB1.1] DNR/DNI per [facility]ST dated 12/2016[AB1.3]  FEN: NPO w/ HE  DVT: Mechanical, SCDs  LINES: PIV  Disposition/Admission Status: >2 midnights for HE.     Plan of care was discussed with attending physician, Dr. Manzo.     Xiomara Zacarias PA-C  Hospitalist Service           Chief Complaint:[AB1.1]   AMS[AB1.2]         HPI:   History is obtained from the patient,[AB1.1] NH staff[AB1.2], and medical record.[AB1.1]     History limited due to patients encephalopathy. The patient notes she feels \"fine\". She denies any pain.     Discussed with NH RN, the patient was DCed 03/24, has been taking her lactulose three times daily but hasn't been stooling. NH RN denies any infectious symptoms.[AB1.2]          Review of Systems:   A 10 point ROS was performed and negative unless otherwise noted in HPI.          Past Medical History:   Reviewed and updated in Epic.[AB1.1]   Past Medical History:   Diagnosis Date     Cirrhosis of liver (H)     due to fatty liver disease     Diabetes (H)      Esophageal varices (H)     s/p banding 10/13     GERD (gastroesophageal reflux disease)      Renal disease     mild elevation of Cr.[AB1.6]            Past Surgical History:   Reviewed and updated in Epic.[AB1.1]   Past Surgical History:   Procedure Laterality Date     APPENDECTOMY       HERNIA REPAIR       HYSTERECTOMY       NEPHRECTOMY Left      TONSILLECTOMY[AB1.6]              Social History:   Reviewed and updated in Epic.[AB1.1]   Social History     Social History     Marital status:      Spouse name: N/A     Number of children: N/A     Years of education: N/A     Occupational History     Not on file.     Social History Main Topics     Smoking status: Former Smoker     Smokeless " tobacco: Not on file     Alcohol use No     Drug use: No     Sexual activity: Not on file     Other Topics Concern     Not on file     Social History Narrative[AB1.6]            Family History:   Reviewed and updated in Epic.          Allergies:[AB1.1]      Allergies   Allergen Reactions     Iodine Anaphylaxis     Talwin [Pentazocine] Shortness Of Breath     Contrast Dye      Eggs [Chicken-Derived Products (Egg)]      Tape [Adhesive Tape]      Codeine Rash     Penicillins Rash[AB1.6]            Medications:[AB1.1]     Prescriptions Prior to Admission   Medication Sig Dispense Refill Last Dose     BUMETANIDE PO Take 0.5 mg by mouth every evening    Past Week at Unknown time     RIFAXIMIN PO Take 550 mg by mouth 2 times daily   3/28/2017 at 0800     lactulose (CHRONULAC) 10 GM/15ML solution Take 20 g by mouth 3 times daily    3/28/2017 at Unknown time     insulin glargine (LANTUS) 100 UNIT/ML injection Inject 32 Units Subcutaneous 2 times daily    3/28/2017 at 0800     SPIRONOLACTONE PO Take 100 mg by mouth daily   3/28/2017 at Unknown time     Bumetanide (BUMEX PO) Take 1 mg by mouth daily    Past Week at Unknown time     OMEPRAZOLE PO Take 20 mg by mouth every morning   3/27/2017 at Unknown time     insulin lispro (HUMALOG) 100 UNIT/ML VIAL Inject 10 Units Subcutaneous 3 times daily (before meals) TID before meals,  or less 10 units; -150 12 units; -200 12 units; -250 13 units; -300 14 units; -350 15 units; -400 16 units   3/28/2017 at Unknown time     Sertraline HCl (ZOLOFT PO) Take 100 mg by mouth daily    3/28/2017 at Unknown time[AB1.4]             Physical Exam:[AB1.1]   Blood pressure 145/62, temperature 97.5  F (36.4  C), temperature source Axillary, resp. rate 16, weight 92.5 kg (203 lb 14.8 oz), SpO2 97 %.[AB1.4]  GENERAL:[AB1.1] Lethargic, arouses to voice, oriented x0.[AB1.2]  HEENT: Anicteric sclera. Mucous membranes moist and without lesions.   CV: RRR. S1, S2.  No murmurs appreciated.   RESPIRATORY: Effort normal on[AB1.1] RA[AB1.4]. Lungs CTAB with no wheezing, rales, rhonchi.   GI: Abdomen soft and non distended, bowel sounds present. No tenderness, rebound, guarding.   MUSCULOSKELETAL: No joint swelling or tenderness. Moves all extremities.   NEUROLOGICAL: No focal deficits.   EXTREMITIES: No peripheral edema. Intact bilateral pedal pulses.   SKIN: No jaundice. No rashes.     Lines/Tubes/Drains:   Peripheral IV 03/28/17 Left Lower forearm (Active)   Number of days:0            Data:   I personally reviewed the following studies:  CMP, lactic acid, Ammonia, glucose, CBC, INR[AB1.1]  Unresulted Labs Ordered in the Past 30 Days of this Admission     Date and Time Order Name Status Description    3/28/2017 1255 Blood culture In process     3/28/2017 1255 Blood culture In process[AB1.6]         CXR 03/282/2017  1. Unchanged moderate left-sided pleural effusion with overlying  atelectasis or consolidation.  2. Diffuse hazy opacities consistent with pulmonary edema versus  infection.[AB1.1]     Revision History        User Key Date/Time User Provider Type Action    > AB1.4 3/28/2017 10:25 PM Xiomara Zacarias PA-C Physician Assistant Sign     AB1.2 3/28/2017  4:36 PM Xiomara Zacarias PA-C Physician Assistant      AB1.3 3/28/2017  4:18 PM Xiomara Zacarias PA-C Physician Assistant      AB1.5 3/28/2017  4:01 PM Xiomara Zacarias PA-C Physician Assistant      AB1.6 3/28/2017  3:43 PM Xiomara Zacarias PA-C Physician Assistant      AB1.1 3/28/2017  3:42 PM Xiomara Zacarias PA-C Physician Assistant                      Discharge Summaries      Discharge Summaries by Rosa Dunne PA-C at 3/31/2017 12:42 PM     Author:  Rosa Dunne PA-C Service:  Internal Medicine Author Type:  Physician Assistant    Filed:  3/31/2017 12:57 PM Date of Service:  3/31/2017 12:42 PM Note Created:  3/31/2017 12:42 PM    Status:  Signed :  Rosa Dunnea,  SADI (Physician Assistant)             [facility]  Discharge Summary    Ursula Crockett MRN# [id number]   YOB: 1950 Age: 66 year old     Date of Admission:  3/28/2017  Date of Discharge:[CM1.1]  3/31/2017[CM1.2]  Admitting Physician:  Esa Manzo MD  Discharge Physician:  Knual Owen MD (Contact:[CM1.1] Rosa Dunne PA-C[CM1.2])  Discharging Service:  Internal Medicine     Primary Provider: Raudel Nicholson          Brief History of Illness:   Ursula Crockett is a 66 year old female with a past medical history of DM2, renal cancer s/p left nephrectomy, CKD stage 3 (of single kidney), 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 She was treated with lactulose protocol and cleared quickly. She was also found to have  UTI with enterococcus, only sensitive to vancomycin. She was discharged back to nursing facility on 3/31 with plan to continue strict lactulose with goal of 4-5 bowel movements per day and to continue IV vancomycin x 6 more days to complete a 7d course.         Primary care provider to do:[CM1.1]   - Ensure patient is getting adequate lactulose dosing at nursing facility  - Ensure patient has completed course of antibiotics for UTI  - Cont to monitor CMP and CBC as outpatient[CM1.3]          Discharge Diagnosis:[CM1.1]   Hepatic encephalopathy[CM1.3]               Discharge Disposition:[CM1.1]   Discharged to nursing home[CM1.2]           Condition on Discharge:   Discharge condition:[CM1.1] Stable[CM1.2]   Code status on discharge:[CM1.1] DNR / DNI[CM1.2]           Procedures:[CM1.1]   None this admission[CM1.2]          Discharge Medications:     Current Discharge Medication List      START taking these medications    Details   vancomycin 1,250 mg Inject 1,250 mg into the vein every 24 hours for 6 days    Associated Diagnoses: Acute cystitis without hematuria          CONTINUE these medications which have CHANGED    Details   lactulose (CHRONULAC) 10 GM/15ML solution Take 30 mLs (20 g) by mouth 3 times daily  Refills: 0    Comments: Hold third dose if patient has already had 4-5 bowel movements since 0600 that day.  Associated Diagnoses: Hepatic encephalopathy (H)         CONTINUE these medications which have NOT CHANGED    Details   !! BUMETANIDE PO Take 0.5 mg by mouth every evening       RIFAXIMIN PO Take 550 mg by mouth 2 times daily      insulin glargine (LANTUS) 100 UNIT/ML injection Inject 32 Units Subcutaneous 2 times daily       SPIRONOLACTONE PO Take 100 mg by mouth daily      !! Bumetanide (BUMEX PO) Take 1 mg by mouth daily       OMEPRAZOLE PO Take 20 mg by mouth every morning      insulin lispro (HUMALOG) 100 UNIT/ML VIAL Inject 10 Units Subcutaneous 3 times daily (before meals) TID before meals,  or less 10 units; -150 12 units; -200 12 units; -250 13 units; -300 14 units; -350 15 units; -400 16 units    Associated Diagnoses: Cirrhosis of liver with ascites, unspecified hepatic cirrhosis type (H)      Sertraline HCl (ZOLOFT PO) Take 100 mg by mouth daily     Associated Diagnoses: Cirrhosis of liver with ascites, unspecified hepatic cirrhosis type (H)       !! - Potential duplicate medications found. Please discuss with provider.                Consultations:   Consultation during this admission received from[CM1.1] GI (hepatology)[CM1.2]              Hospital Course:[CM1.1]   Hepatic encephalopathy. S/p TIPS in 11/2016. Recent admission to [facility] 03/20-03/24 for HE (on lactulose), 02/25-02/28 for HE 2/2 noncompliance w/ lactulose. PTA maintained on rifaximin 550 mg BID, lactulose 30 mg TID titrated to 3-5 stools/day at NH. NH documentation w/o any missed doses of lactulose- but discussed with NH staff and they note that she hasn't been stooling. Ammonia of 194 in ED. No drugs (narcs or benzos). Infx w/u positive for  UTI (as below), otherwise unremarkable w/ CXR noting moderate left sided pluaral effusion w/ overlying atelecatsis or consolidation (unchanged), diffuse hazy opacities consistent w/ pulm edema vs infection, afebrile, WBC wnl, lactic acid wnl, no ascites on bedside US to r/o dx para. Hgb stable, no e/o GIB. Abd US performed 3/28 showed patent TIPS, increased shunt velocity wnl but still concerning for stenosis with impending TIPS failure. VSS. Most likely due to constipation on incorrect lactulose dosing. Mental status is improved with lactulose protocol and treating UTI as below. Discussed with Dr. Haddad (IR) regarding TIPs, he recommends that if patient has another admission for HE, that IR should be consulted to narrow the TIPs (which would result in more ascites, but may help with HE).  - Continue lactulose 20g TID, titrate to have 4 BMs per day. Clearly outlined this plan on discharge orders for NH to follow. If patient has not had 4BMs by 2000 daily, will need an extra dose of po lactulose.   - Continue rifaximin     HEADLEY cirrhosis. Follows w/ Dr. Mckeon. C/B EV s/p banding x7 10/16, portal hypertensive gastropathy, ascites s/p TIPS, requires nora once/3 weeks w/ 2 L ascitic fluid off. On bumex 1 mg qam, 0.5 mg qpm, aldactone 100 mg qam, 50 mg qafternoon,lactulose and rifaximin as above PTA. Platelets stable, bili stable, INR stable. MELD Na 15 today. No ascites on bedside US 3/29 for para. Continues on lactulose as outlined above. Home diuretics resumed on 3/31. No need for SBP ppx.     UTI. UCx from 3/28 with enterococcus, only sensitive to vancomycin. Although patient denies any urinary symptoms at this time, will opt to treat given frequent readmissions for HE. PICC placed 3/31. Will continue on vancomycin at nursing home to continue for total of 7d course. Kidney function and vancomycin levels to be monitor closely given CKD.       Left sided pleural effusion. Present since 11/2016. ECHO 03/2017 at   w/ normal LV EF at 60% but grade II diastolic dysfunction, normal RV size and function, no valvular abnormalities. Refused thora at [facility] recently. Most likely 2/2 ascites and HEADLEY cirrhosis. Did not require O2 this admission. Continued on home diuretics on discharge.      DM2. On Lantus 32 units BID, humalog 5-12 unit custom subq TID AC. Hgb A1C 6.9 in 04/2016. Pt was NPO d/t encephalopathy on admission 3/28, and when she began eating her BGs elevated to 400-500. Required insulin gtt 3/30-3/31. Transitioned back to home regimen (listed above) on 3/31 with stable BGs following.     ARMANDO on CKD stage 3. Resolved. Baseline creat ~1.5 since 10/2016, admitted w/ creat of 1.88, elevated BUN to 34. Cr improved with treatment of UTI and encephalopathy, with holding of home diuretics. Cr improved to baseline of 1.5 on day of discharge. Tolerating po intake. Home diuretics resumed 3/31. UTI being treated as above. Avoid nephrotoxins. Vanco levels and BMP to be monitored closely on discharge.       Normocytic anemia. Hgb of 10.1 on admission which is baseline. Normal MCV. Iron studies in 04/2016 w/ low iron sat, normal IBC, normal iron. Likely due to liver disease, CKD. Hgb stable this admission.       [CM1.2]            Final Day of Progress before Discharge:       Physical Exam:  Blood pressure 116/44, temperature 97.8  F (36.6  C), temperature source Oral, resp. rate 18, weight 85.7 kg (189 lb), SpO2 98 %.  GENERAL: Alert and oriented x 3. NAD.[CM1.1] Sitting up in bed. Cooperative.[CM1.2]   HEENT: Anicteric sclera. PERRL. M[CM1.1]MM.[CM1.2]   CV: RRR. S1, S2. No m[CM1.1]/r/g.[CM1.2]   RESPIRATORY: Effort normal[CM1.1] on RA[CM1.2]. Lungs CTAB with no wheezing, rales, rhonchi.[CM1.1] Scant crackles in LLL.[CM1.2]   GI: Abdomen soft and non distended with normoactive bowel sounds present in all quadrants. No tenderness, rebound, guarding.   MUSCULOSKELETAL: No joint swelling or tenderness. Moves all extremities.    NEUROLOGICAL: No focal deficits. Strength 5/5 bilaterally in upper and lower extremities.[CM1.1] No asterixis.[CM1.2]   EXTREMITIES: No peripheral edema. Intact bilateral pedal pulses.   SKIN: No jaundice. No rashes.        Data:  All laboratory data reviewed             Significant Results:[CM1.1]     Lab Results   Component Value Date    WBC 8.3 03/31/2017    HGB 9.6 (L) 03/31/2017    HCT 29.3 (L) 03/31/2017     (L) 03/31/2017     03/31/2017    POTASSIUM 4.0 03/31/2017    CHLORIDE 102 03/31/2017    CO2 25 03/31/2017    BUN 31 (H) 03/31/2017    CR 1.50 (H) 03/31/2017     (H) 03/31/2017    DD 10.9 (H) 11/19/2016    NTBNPI 744 01/06/2017    AST 20 03/31/2017    ALT 17 03/31/2017    ALKPHOS 99 03/31/2017    BILITOTAL 1.3 03/31/2017    EHSAN 194 (HH) 03/28/2017    INR 1.23 (H) 03/31/2017[CM1.4]      No results found for this or any previous visit (from the past 48 hour(s)).             Pending Results:   Unresulted Labs Ordered in the Past 30 Days of this Admission     Date and Time Order Name Status Description    3/28/2017 1255 Blood culture Preliminary     3/28/2017 1255 Blood culture Preliminary                   Discharge Instructions and Follow-Up:     Discharge Procedure Orders  General info for SNF   Order Comments: Length of Stay Estimate: Long Term Care  Condition at Discharge: Improving  Level of care:skilled   Rehabilitation Potential: Fair  Admission H&P remains valid and up-to-date: Yes  Recent Chemotherapy: N/A  Use Nursing Home Standing Orders: Yes     Mantoux instructions   Order Comments: Give two-step Mantoux (PPD) Per Facility Policy Yes     Reason for your hospital stay   Order Comments: Ursula Crockett is a 66 year old female with a past medical history of DM2, renal cancer s/p left nephrectomy, CKD stage 3 (of single kidney), 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 She was treated  with lactulose protocol and cleared quickly. She was also found to have  UTI with enterococcus, only sensitive to vancomycin. She was discharged back to nursing facility on 3/31 with plan to continue strict lactulose with goal of 4-5 bowel movements per day and to continue IV vancomycin x 6 more days to complete a 7d course.     IV access   Order Comments: PICC.     Follow Up and recommended labs and tests   Order Comments: Needs to have vancomycin level checked 4/2 and dose adjusted per pharmacy if high  Needs to have BMP check on 4/2 to monitor creatinine while on vancomycin  Needs to have CBC, CMP checked on 4/3  Follow up with primary care provider in 1-2 weeks time for routine follow up     Additional Discharge Instructions   Order Comments: Please follow bowel plan:  Scheduled lactulose 20g three times daily - hold third dose if patient has already had 4 bowel movements since 0600 that day  If by 2000 each day, if patient has not had 4 bowel movements, will need an extra 20g of po lactulose     Activity - Up with nursing assistance   Order Specific Question Answer Comments   Is discharge order? Yes      DNR/DNI     Physical Therapy Adult Consult   Order Comments: Evaluate and treat as clinically indicated.    Reason:  Continue with therapies for deconditioning     Occupational Therapy Adult Consult   Order Comments: Evaluate and treat as clinically indicated.    Reason:  Continue with therapies for deconditioning     Advance Diet as Tolerated   Order Comments: Follow this diet upon discharge:      Moderate Consistent CHO Diet   Order Specific Question Answer Comments   Is discharge order? Yes               Rosa Dunne  [facility] Hospitalist  [phone]  March 31, 2017        Time spent on patient: 45 minutes total including face to face and coordinating care time reviewing current illness, any medication changes, and the care plan for today.    Discharge plan was discussed with Dr Owen, patient, RN  CC, and SW.[CM1.1]             Revision History        User Key Date/Time User Provider Type Action    > CM1.4 3/31/2017 12:57 PM Rosa Dunne PA-C Physician Assistant Sign     CM1.2 3/31/2017 12:49 PM Rosa Dunne PA-C Physician Assistant      CM1.3 3/31/2017 12:46 PM Rosa Dunne PA-C Physician Assistant      CM1.1 3/31/2017 12:42 PM Rosa Dunne PA-C Physician Assistant                      Consult Notes      Consults by Crystal Mckeon MD at 3/29/2017 11:06 AM     Author:  Crystal Mckeon MD Service:  Gastroenterology Author Type:  Physician    Filed:  3/31/2017 12:36 PM Date of Service:  3/29/2017 11:06 AM Note Created:  3/29/2017 11:06 AM    Status:  Signed :  Crystal Mckeon MD (Physician)     Consult Orders:    1. GI Hepatology Adult IP Consult: Patient to be seen: Routine within 24 hrs; Call back #: [id number]; HE, HEADLEY cirrhosis; Consultant may enter orders: Yes [[id number]] ordered by Xiomara Zacarias PA-C at 03/28/17 1633                    GASTROENTEROLOGY CONSULTATION      Date of Admission:  3/28/2017          ASSESSMENT AND RECOMMENDATIONS:   Assessment:  66 year old female with a history of HEADLEY cirrhosis complicated by esophageal varices s/p banding 10/2016, ascites s/p TIPS 11/2016, and recent episodes of hepatic encephalopathy, renal cancer s/p left nephrectomy, and CKD III who was brought to the hospital from her TCU due t[EE1.1]o altered mental status concerning for hepatic encephalopathy. Most likely etiology for her current HE is constipation and possibly under dosing of lactulose. Medication non-adherence has been an issue in the past, however per nursing home she has been taking her lactulose and rifaximin as scheduled making this unlikely. Infectious causes have been pursued, UA non-infectious, blood cultures with NGTD. Ultrasound read is pending, if appreciable ascites present would obtain  diagnostic paracentesis. GI bleed seems unlikely given stable hemoglobin and no melena/hematochezia. Swallow study is pending, if she is appropriate can begin oral lactulose, continue with enema while npo. Agree with holding diuretics as she appears to be intravascularly down (hypernatremia). Ultrasound in process to evaluate TIPS.[EE1.2] Eventually may need discussion of goals of care given recent hospitalizations with altered mental status.[EE1.3]     Recommendations[EE1.1]:  -continue lactulose either via enema or orally if mental status allows  -rifaximin as able  -agree with holding diuretics  -if adequate ascites, obtain diagnostic paracentesis  -will follow up ultrasound results[EE1.3]    Gastroenterology follow up recommendations:[EE1.1] we will continue to follow[EE1.3]    Thank you for involving us in this patient's care. Please do not hesitate to contact the GI service with any questions or concerns.     Pt care plan discussed with [EE1.1] Crystal Mckeon[EE1.3], GI staff physician.    Jeffery [address][EE1.1]  Internal Medicine PGY1[EE1.2]  -------------------------------------------------------------------------------------------------------------------          Chief Complaint:   We were asked by Xiom[facility] medicine to evaluate this patient with altered mental status    History is obtained from the medical record, limited history from patient given altered mental status.          History of Present Illness:   Ursula Crockett is a 66 year old female with a history of HEADLEY cirrhosis complicated by esophageal varices s/p banding 10/2016, ascites s/p TIPS 11/2016, and recent episodes of hepatic encephalopathy, renal cancer s/p left nephrectomy, and CKD III who was brought to the hospital from her TCU due to alerted mental status.    This is now Mrs. Crockett's third hospitalization in the past month with altered mental status. She was hospitalized from 2/25-2/28 with hepatic encephalopathy  in the context of not taking lactulose. She was again hospitalized from 3/20-3/24 for hepatic encephalopathy with elevated ammonia. She was discharged with increased dose of lactulose. At the time of discharge her mental status was back to baseline. It appears over the past few days she was becoming increasingly altered. She was taking her lactulose but was not having bowel movements per the nursing home. No concerns for infection per nursing home.     TIPS was performed in November 2016 for recurrent ascites and fluid overload. Overall this appears to be improving. Had an ultrasound on 3/10 that showed increased intrastent velocity concerning for potential impending TIPS failure.            Past Medical History:   Reviewed and edited as appropriate  Past Medical History:   Diagnosis Date     Cirrhosis of liver (H)     due to fatty liver disease     Diabetes (H)      Esophageal varices (H)     s/p banding 10/13     GERD (gastroesophageal reflux disease)      Renal disease     mild elevation of Cr.            Past Surgical History:   Reviewed and edited as appropriate   Past Surgical History:   Procedure Laterality Date     APPENDECTOMY       HERNIA REPAIR       HYSTERECTOMY       NEPHRECTOMY Left      TONSILLECTOMY              Previous Endoscopy:   No results found for this or any previous visit.         Social History:   Reviewed and edited as appropriate  Social History     Social History     Marital status:      Spouse name: N/A     Number of children: N/A     Years of education: N/A     Occupational History     Not on file.     Social History Main Topics     Smoking status: Former Smoker     Smokeless tobacco: Not on file     Alcohol use No     Drug use: No     Sexual activity: Not on file     Other Topics Concern     Not on file     Social History Narrative            Family History:   Reviewed and edited as appropriate  Un able to obtain due to patient altered mental status       Allergies:   Reviewed  and edited as appropriate     Allergies   Allergen Reactions     Iodine Anaphylaxis     Talwin [Pentazocine] Shortness Of Breath     Contrast Dye      Eggs [Chicken-Derived Products (Egg)]      Tape [Adhesive Tape]      Codeine Rash     Penicillins Rash            Medications:[EE1.1]     Prescriptions Prior to Admission   Medication Sig Dispense Refill Last Dose     BUMETANIDE PO Take 0.5 mg by mouth every evening    Past Week at Unknown time     RIFAXIMIN PO Take 550 mg by mouth 2 times daily   3/28/2017 at 0800     lactulose (CHRONULAC) 10 GM/15ML solution Take 20 g by mouth 3 times daily    3/28/2017 at Unknown time     insulin glargine (LANTUS) 100 UNIT/ML injection Inject 32 Units Subcutaneous 2 times daily    3/28/2017 at 0800     SPIRONOLACTONE PO Take 100 mg by mouth daily   3/28/2017 at Unknown time     Bumetanide (BUMEX PO) Take 1 mg by mouth daily    Past Week at Unknown time     OMEPRAZOLE PO Take 20 mg by mouth every morning   3/27/2017 at Unknown time     insulin lispro (HUMALOG) 100 UNIT/ML VIAL Inject 10 Units Subcutaneous 3 times daily (before meals) TID before meals,  or less 10 units; -150 12 units; -200 12 units; -250 13 units; -300 14 units; -350 15 units; -400 16 units   3/28/2017 at Unknown time     Sertraline HCl (ZOLOFT PO) Take 100 mg by mouth daily    3/28/2017 at Unknown time[EE1.4]             Review of Systems:   A complete review of systems was performed and is negative except as noted in the HPI           Physical Exam:   /51 (BP Location: Left arm)  Temp 97.6  F (36.4  C) (Axillary)  Resp 18  Wt 89 kg (196 lb 3.4 oz)  SpO2 98%  BMI 27.37 kg/m2  Wt:   Wt Readings from Last 2 Encounters:   03/29/17 89 kg (196 lb 3.4 oz)   02/10/17 116.8 kg (257 lb 9.6 oz)      Constitutional: tired, confused, awakes briefly but not consistently following commands  Eyes: Sclera anicteric  Ears/nose/mouth/throat: Normal oropharynx without ulcers or  exudate, mucus membranes slightly dry, hearing intact  Neck: supple, thyroid normal size, no cervical LAD  CV: normal rate, regular rhythm, no murmur/rub/gallop  Respiratory: clear bilaterally  Abd: soft, nondistended, +bs, no hepatosplenomegaly appreciated, appears nontender, no rebound or guarding  EXT: trace bilateral LE edema  Skin: warm, perfused, no jaundice  Neuro: tired, confused, oriented to self not to place/year/month         Data:   Labs and imaging below were independently reviewed and interpreted    BMP  Recent Labs  Lab 03/29/17  0720 03/28/17  1217   * 145*   POTASSIUM 3.7 4.0   CHLORIDE 109 107   ANY 8.6 8.4*   CO2 28 29   BUN 35* 34*   CR 1.91* 1.88*   * 122*     CBC  Recent Labs  Lab 03/29/17  0720 03/28/17  1217   WBC 8.1 5.3   RBC 3.68* 3.62*   HGB 10.2* 10.1*   HCT 32.5* 31.9*   MCV 88 88   MCH 27.7 27.9   MCHC 31.4* 31.7   RDW 16.2* 16.1*    158     INR  Recent Labs  Lab 03/29/17  0720 03/28/17  1217   INR 1.25* 1.21*     LFTs  Recent Labs  Lab 03/29/17  0720 03/28/17  1217   ALKPHOS 97 112   AST 22 19   ALT 14 15   BILITOTAL 1.4* 0.9   PROTTOTAL 6.1* 6.0*   ALBUMIN 2.5* 2.5*      PANCNo lab results found in last 7 days.    Imaging:[EE1.1]  Abdominal ultrasound pending[EE1.3]    HEPATOLOGY/GASTROENTEROLOGY ATTENDING NOTE    I saw, examined and discussed the patient with the resident. I participated in the decision making and agree with the above note. Crystal Mckeon MD[JT1.1]     Revision History        User Key Date/Time User Provider Type Action    > JT1.1 3/31/2017 12:36 PM Crystal Mckeon MD Physician Sign     [N/A] 3/29/2017  1:32 PM Jeffery St MD Resident Sign     EE1.3 3/29/2017  1:03 PM Jeffery St MD Resident Sign     EE1.2 3/29/2017 12:17 PM Jeffery St MD Resident      EE1.4 3/29/2017 11:09 AM Jeffery St MD Resident      EE1.1 3/29/2017 11:06 AM Jeffery St MD Resident                       Progress Notes - Physician (Notes for yesterday and today)      Progress Notes by Jeffery St MD at 3/30/2017 10:35 AM     Author:  Jeffery St MD Service:  Gastroenterology Author Type:  Resident    Filed:  3/30/2017  4:10 PM Date of Service:  3/30/2017 10:35 AM Note Created:  3/30/2017 10:35 AM    Status:  Signed :  Jeffery St MD (Resident)         HEPATOLOGY PROGRESS NOTE  Ursula Crockett [id number]   03/30/2017    SUBJECTIVE:  No acute overnight events. Mental status is clearing, was able to eat dinner last night and breakfast this morning. Denies abdominal pain, fevers, chills, and dysuria. Is interested in if her TIPS is still working. Feels back to baseline.    A complete 10 point review of systems was obtained and was negative unless noted.    OBJECTIVE:  Constitutional: NAD, awake, alert, less confused than previous day  VS:  /54 (BP Location: Left arm)  Temp 98.5  F (36.9  C) (Oral)  Resp 16  Wt 89.5 kg (197 lb 5 oz)  SpO2 99%  BMI 27.52 kg/m2    Eyes: No scleral icterus.   Ears/nose/mouth/throat: Oropharynx normal. MMM.  Resp: CTA bilaterally superiorly and anteriorly.  CV: RRR., no murmur/rub/gallop. Peripheral pulses present.  Abdominal: Soft, NTND, bowel sounds present.  /Rectal: Not examined.   EXT: Moving all extremities. Trace edema in BLE.  Skin: No jaundice.  Neurological: oriented to place, year, and month. No asterixis.  Psych: Normal affect and mood.      REVIEW OF LABORATORY, PATHOLOGY AND IMAGING RESULTS:  BMP  Recent Labs  Lab 03/30/17  0549 03/29/17  0720 03/28/17  1217    146* 145*   POTASSIUM 4.1 3.7 4.0   CHLORIDE 104 109 107   ANY 8.5 8.6 8.4*   CO2 27 28 29   BUN 37* 35* 34*   CR 1.69* 1.91* 1.88*   * 121* 122*     CBC  Recent Labs  Lab 03/30/17  0549 03/29/17  0720 03/28/17  1217   WBC 8.4 8.1 5.3   RBC 3.52* 3.68* 3.62*   HGB 9.9* 10.2* 10.1*   HCT 31.3* 32.5* 31.9*   MCV 89 88 88   MCH 28.1 27.7  27.9   MCHC 31.6 31.4* 31.7   RDW 16.1* 16.2* 16.1*    157 158     INR  Recent Labs  Lab 03/30/17  0549 03/29/17  0720 03/28/17  1217   INR 1.24* 1.25* 1.21*     LFTs  Recent Labs  Lab 03/30/17  0549 03/29/17  0720 03/28/17  1217   ALKPHOS 93 97 112   AST 22 22 19   ALT 16 14 15   BILITOTAL 1.3 1.4* 0.9   PROTTOTAL 6.1* 6.1* 6.0*   ALBUMIN 2.5* 2.5* 2.5*      PANCNo lab results found in last 7 days.    MELD-Na score: 15 at 3/30/2017  5:49 AM  MELD score: 15 at 3/30/2017  5:49 AM  Calculated from:  Serum Creatinine: 1.69 mg/dL at 3/30/2017  5:49 AM  Serum Sodium: 139 mmol/L (Rounded to 137) at 3/30/2017  5:49 AM  Total Bilirubin: 1.3 mg/dL at 3/30/2017  5:49 AM  INR(ratio): 1.24 at 3/30/2017  5:49 AM  Age: 66 years    IMPRESSION/RECOMMENDATIONS: Ursula Crockett is a 66 year old female with a history of HEADLEY cirrhosis complicated by esophageal varices s/p banding 10/2016, ascites s/p TIPS 11/2016, and recent episodes of hepatic encephalopathy, renal cancer s/p left nephrectomy, and CKD III who was brought to the hospital from her TCU due to altered mental status concerning for hepatic encephalopathy. Mental status has greatly improved after receiving lactulose. Urine culture returned positive for >100k Enterococcus[EE1.1]. Even though she[EE1.2] is without clinical symptoms of a UTI and she has improved without antibiotics[EE1.1] we would recommend treating based on sensitivities given her age and frailty[EE1.2]. Not adequate amount of ascites for diagnostic paracentesis. Ultrasound showed functioning TIPS but increased velocity, this should likely be followed in the future to ensure TIPS does not fail.[EE1.1] C[EE1.2]ontinu[EE1.1]e[EE1.2] with lactulose and rifaximin[EE1.1], agree with transitioning to scheduled lactulose.    -lactulose 20g TID  -rifaximin 550mg BID  -would recommend treating enterococcus on urine culture  -2 gram sodium diet given cirrhosis[EE1.2]    Hepatology will continue to follow. Please  page with questions.     Patient discussed with Dr. Santos.     Jeffery St MD  Internal Medicine PGY1[EE1.1]       Revision History        User Key Date/Time User Provider Type Action    > EE1.2 3/30/2017  4:10 PM Jeffery St MD Resident Sign     EE1.1 3/30/2017 10:35 AM Jeffery St MD Resident             Progress Notes by Rosa Dunne PA-C at 3/30/2017  1:27 PM     Author:  Rosa Dunne PA-C Service:  Internal Medicine Author Type:  Physician Assistant    Filed:  3/30/2017  3:16 PM Date of Service:  3/30/2017  1:27 PM Note Created:  3/30/2017  1:27 PM    Status:  Addendum :  Rosa Dunne PA-C (Physician Assistant)                 [facility] - Internal Medicine Daily Note   Date of Service: 3/30/2017  Patient: Ursula Crockett  MRN: [id number]  Admission Date: 3/28/2017  Hospital Day # 2    Assessment & Plan: Ursula Crockett is a 66 year old female with a past medical history of DM2, renal cancer s/p left nephrectomy, CKD stage 3 (of single kidney), HEADLEY cirrhosis c/b HE, EV s/p banding in 10/2016 and refractory ascites s/p TIPS 11/2016 who is admitted for AMS, found to have HE.    Hepatic encephalopathy. S/p TIPS in 11/2016. Recent admission to [facility] 03/20-03/24 for HE (on lactulose), 02/25-02/28 for HE 2/2 noncompliance w/ lactulose. PTA maintained on rifaximin 550 mg BID, lactulose 30 mg TID titrated to 3-5 stools/day at NH. NH documentation w/o any missed doses of lactulose- but discussed with NH staff and they note that she hasn't been stooling. Ammonia of 194 in ED. No drugs (narcs or benzos). Infx w/u w/ CXR noting moderate left sided pluaral effusion w/ overlying atelecatsis or consolidation (unchanged), diffuse hazy opacities consistent w/ pulm edema vs infection, UA w/o e/o infection, afebrile, WBC wnl, lactic acid wnl, no ascites on bedside US to r/o dx para. Hgb stable, no e/o GIB. Abd US performed 3/28 showed patent  TIPS, increased shunt velocity wnl but still concerning for stenosis with impending TIPS failure. VSS. Most likely due to constipation on incorrect lactulose dosing. Mental status is improved today on lactulose protocol. Discussed with Dr. aHddad[MJ1.1] (IR) regarding TIPs, he recommends that if patient has another admission for HE, that IR should be consulted to narrow the TIPs (which would result in more ascites, but may help with HE).[CM1.1]  - Continue lactulose[MJ1.1] 20g TID, titrate to have 3-4 BMs per day[CM1.1]  - Continue rifaximin  - Follow up on BCx x2, NGTD  - Trend CBC, CMP qd[MJ1.1]  - Will need to instruct nursing home on goals BMs in hopes to prevent further admissions[CM1.1]    HEADLEY cirrhosis. Follows w/ Dr. Mckeon. C/B EV s/p banding x7 10/16, portal hypertensive gastropathy, ascites s/p TIPS, requires nora once/3 weeks w/ 2 L ascitic fluid off. On bumex 1 mg qam, 0.5 mg qpm, aldactone 100 mg qam, 50 mg qafternoon,lactulose and rifaximin as above PTA. Platelets stable, bili stable, INR stable. MELD Na[MJ1.1] 15 today.[CM1.1] No ascites on bedside US 3/29 for para.   -Could use IV lasix PRN for hypervolemia  -Lactluose protocol as above  -[MJ1.1]No need for SBP ppx[CM1.1]      Left sided pleural effusion. Present since 11/2016. ECHO 03/2017 at  w/ normal LV EF at 60% but grade II diastolic dysfunction, normal RV size and function, no valvular abnormalities. Refused thora at [facility] recently. Most likely 2/2 ascites and HEADLEY cirrhosis. Not requiring O2.   - Monitor O2 sats  - Consider thora pending symptoms  - No IVF or albumin    DM2. On Lantus 32 units BID, humalog 5-12 unit custom subq TID AC. Hgb A1C 6.9 in 04/2016. Pt[MJ1.1] was[CM1.2] NPO d/t encephalopathy on admission 3/28.[MJ1.1] BGs now increased to 400-500s currently in setting of encephalopathy clearing and starting po intake without lantus being started soon enough  - Start insulin gtt, plan to transition to home regimen  tomorrow[CM1.2]  - Hypoglycemia protocol[MJ1.1]    ARMANDO on[CM1.1] CKD stage 3.[MJ1.1] Resolved.[CM1.1] Baseline creat ~1.5 since 10/2016, admitted w/ creat of 1.88, elevated BUN to 34. Cr improved to 1.69 (1.91) today. ARMANDO likely pre-renal from not tolerating po intake d/t encephalopathy. UA not c/w infection. Of note patient has single kidney as she is s/p left nephrectomy in 2011.   - Cont to hold diuretics[MJ1.1], plan to resume tomorrow[CM1.1]  - Will hold on albumin or fluids given L sided pleural effusion as above  - Avoid nephrotoxics      Normocytic anemia. Hgb of 10.1 on admission which is baseline. Normal MCV. Iron studies in 04/2016 w/ low iron sat, normal IBC, normal iron. Likely due to liver disease, CKD. Hgb stable[MJ1.1] 9.9.     Positive urine culture. UCx from 3/28 with enterococcus, final pending. Patient denies urinary symptoms. Afebrile. Will cont to monitor for final culture, hold on treating at this time.[CM1.1]       CODE: DNR/DNI per POLST dated 12/2016  FEN:[MJ1.1] mod CHO diet[CM1.1]   DVT: Mechanical, SCDs  LINES: PIV  Disposition/Admission Status: inpatient, plan to discharge back to TCU tomorrow if medically stable       Patient's care was discussed with bedside RN, patient, and MD during Care Team Rounds.[MJ1.1]      Rosa Dunne PA-C[CM1.3]  [phone][CM1.1]  Team: Medicine Gold 2  Page Cross Cover after 5 pm on pager [phone].    ___________________________________________________________________    Subjective & Interval History:     Janis was sitting up in bed and looks more alert today. She is able to tell me the date, the president, and the name of the hospital. She is happy to be eating PO today. She denies pain, SOB, chest pain, fevers/chills, and abdominal pain. She says she feels tired today as she did not sleep well last night.     Last 24 hour care team notes reviewed.   ROS: 4 point ROS (including Respiratory, CV, GI and ) was performed and negative unless  otherwise noted in HPI.     Medications: Reviewed in EPIC.    Physical Exam:    Blood pressure 129/55, temperature 98  F (36.7  C), temperature source Oral, resp. rate 16, weight 89.5 kg (197 lb 5 oz), SpO2 98 %.    GENERAL: Sitting up in bed. Alert and orientated x 3. NAD.  HEENT: Anicteric sclera. Mucous membranes moist.  NECK: Supple.  CV: RRR. No murmurs, clicks, or rubs.  RESPIRATORY: CTAB. No wheezes or crackles.  GI: Abdomen soft and non-tender. No palpable masses. Normoactive bowel sounds in all quadrants.  NEURO: No focal deficits. No asterixis.    MUSCULOSKELETAL/EXTREMITIES: No peripheral edema.   PSYCH: No ja[MJ1.1]u[CM1.1]ndice or rashes.[MJ1.1]         Revision History        User Key Date/Time User Provider Type Action    > CM1.2 3/30/2017  3:16 PM Rosa Dunne PA-C Physician Assistant Addend     CM1.3 3/30/2017  2:44 PM Rosa Dunne PA-C Physician Assistant Sign     CM1.1 3/30/2017  2:29 PM Rosa Dunne PA-C Physician Assistant      MJ1.1 3/30/2017  1:59 PM Addie Naqvi Nurse Practitioner [place]                  Procedure Notes     No notes of this type exist for this encounter.         Progress Notes - Therapies (Notes from 03/28/17 through 03/31/17)      Progress Notes by Antoinna Farr OT at 3/30/2017  4:57 PM     Author:  Antonina Farr OT Service:  (none) Author Type:  Occupational Therapist    Filed:  3/30/2017  4:57 PM Date of Service:  3/30/2017  4:57 PM Note Created:  3/30/2017  4:57 PM    Status:  Signed :  Antonina Farr OT (Occupational Therapist)          03/30/17 6817   Quick Adds   Type of Visit Initial Occupational Therapy Evaluation   Living Environment   Living Arrangements house  (1 level)   Home Accessibility stairs to enter home   Number of Stairs to Enter Home 3   Number of Stairs Within Home 0   Transportation Available family or friend will provide   Living Environment Comment Pt was admitted from TCU however  she does own her own home and eventually the goal is to return there independently - pt did have a roommate at her house however that individual has moved and she would be living there alone   Self-Care   Dominant Hand right   Usual Activity Tolerance moderate   Current Activity Tolerance fair   Regular Exercise yes   Activity/Exercise Type (OT/PT at TCU)   Exercise Amount/Frequency daily   Equipment Currently Used at Home walker, rolling;grab bar  (built in shower chair)   Activity/Exercise/Self-Care Comment Pt reports prior to recent hospitalizations and TCU stay she was using FWW in her home for mobility; pt reports at TCU she was using FWW - staff assisting with all mobility and transfers   Functional Level Prior   Ambulation 3-->assistive equipment and person   Transferring 3-->assistive equipment and person   Toileting 3-->assistive equipment and person   Bathing 3-->assistive equipment and person   Dressing 3-->assistive equipment and person   Eating 0-->independent   Communication 0-->understands/communicates without difficulty   Swallowing 0-->swallows foods/liquids without difficulty   Cognition 0 - no cognition issues reported   Fall history within last six months yes   Number of times patient has fallen within last six months 1   Which of the above functional risks had a recent onset or change? ambulation;transferring;toileting;bathing;dressing;cognition;fall history   Prior Functional Level Comment Prior to recent hospitalizations and TCU stay pt was living at home and (I) with all ADL/IADLs; she no longer drives (son assists with rides) and she does not work outside the home; At TCU pt was receiving staff assist with all transfers and functional mobility   General Information   Onset of Illness/Injury or Date of Surgery - Date 03/28/17   Referring Physician Xiomara Zacarias PA-C   Patient/Family Goals Statement to return to TCU and then eventually home   Additional Occupational Profile Info/Pertinent  "History of Current Problem Pt is a 66 year old female with a past medical history of DM2, renal cancer s/p left nephrectomy, CKD stage 3 (of single kidney), HEADLEY cirrhosis c/b HE, EV s/p banding in 10/2016 and refractory ascites s/p TIPS 11/2016 who is admitted for AMS, found to have HE   Precautions/Limitations fall precautions   Weight-Bearing Status - LUE full weight-bearing   Weight-Bearing Status - RUE full weight-bearing   Weight-Bearing Status - LLE full weight-bearing   Weight-Bearing Status - RLE full weight-bearing   Cognitive Status Examination   Orientation person;place  (day, month; stated year as \"2007\", \"Trump\" as president)   Level of Consciousness alert;confused   Able to Follow Commands success, 1-step commands   Personal Safety (Cognitive) moderate impairment   Memory impaired   Attention Sustained attention impaired   Cognitive Comment Pt with HE - to be further assessed as pt clears   Visual Perception   Visual Perception Wears glasses  (all the time)   Visual Perception Comments Pt reports no new concerns with her vision though she complains she is having trouble seeing here as she does not have her glasses at the hospital   Sensory Examination   Sensory Quick Adds No deficits were identified   Pain Assessment   Patient Currently in Pain No   Range of Motion (ROM)   ROM Comment BUEs WFL   Strength   Strength Comments RUE 4+5, L shoulder 3+/5 (pt reports no pain but \"overworked\" with therapies at TCU); remainder of LUE WFL; weak B    Mobility   Bed Mobility Bed mobility skill: Supine to sit;Bed mobility skill: Sit to supine;Bed mobility skill: Scooting/Bridging;Bed mobility skill: Rolling/Turning   Bed Mobility Skill: Rolling/Turning   Level of [place] - Bed Mobility Skill Rolling Turning minimum assist (75% patients effort)   Physical Assist/Nonphysical Assist verbal cues;1 person assist   Bed Mobility Skill: Scooting/Bridging   Level of [place]: Scooting/Bridging minimum assist " (75% patients effort)   Physical Assist/Nonphysical Assist: Scooting/Bridging verbal cues;1 person assist   Bed Mobility Skill: Sit to Supine   Level of [place]: Sit/Supine contact guard   Physical Assist/Nonphysical Assist: Sit/Supine verbal cues;1 person assist   Bed Mobility Skill: Supine to Sit   Level of [place]: Supine/Sit minimum assist (75% patients effort)   Physical Assist/Nonphysical Assist: Supine/Sit verbal cues;1 person assist   Transfer Skill: Sit to Stand   Level of [place]: Sit/Stand moderate assist (50% patients effort)   Physical Assist/Nonphysical Assist: Sit/Stand verbal cues;1 person assist   Lower Body Dressing   Level of [place]: Dress Lower Body moderate assist (50% patients effort)   Physical Assist/Nonphysical Assist: Dress Lower Body verbal cues;1 person assist   Activities of Daily Living Analysis   Impairments Contributing to Impaired Activities of Daily Living balance impaired;cognition impaired;strength decreased   General Therapy Interventions   Planned Therapy Interventions ADL retraining;IADL retraining;bed mobility training;cognition;ROM;strengthening;stretching;transfer training;home program guidelines;progressive activity/exercise   Clinical Impression   Criteria for Skilled Therapeutic Interventions Met yes, treatment indicated   OT Diagnosis decreased ADL/IADL (I)   Influenced by the following impairments weakness, balance, cognition   Assessment of Occupational Performance 5 or more Performance Deficits   Identified Performance Deficits toileting, grooming, bathing, dressing, cooking, household management, med mgmt, financial mgmt   Clinical Decision Making (Complexity) Moderate complexity   Therapy Frequency other (see comments)  (6x/week)   Predicted Duration of Therapy Intervention (days/wks) 7 days   Anticipated Discharge Disposition Transitional Care Facility   Risks and Benefits of Treatment have been explained. Yes   Patient, Family & other staff in  "agreement with plan of care Yes   [facility] TM \"6 Clicks\"   2016, Trustees of [facility], under license to [employer].  All rights reserved.   6 Clicks Short Forms Daily Activity Inpatient Short Form   [facility]  \"6 Clicks\" Daily Activity Inpatient Short Form   1. Putting on and taking off regular lower body clothing? 2 - A Lot   2. Bathing (including washing, rinsing, drying)? 2 - A Lot   3. Toileting, which includes using toilet, bedpan or urinal? 2 - A Lot   4. Putting on and taking off regular upper body clothing? 2 - A Lot   5. Taking care of personal grooming such as brushing teeth? 3 - A Little   6. Eating meals? 3 - A Little   Daily Activity Raw Score (Score out of 24.Lower scores equate to lower levels of function) 14   Total Evaluation Time   Total Evaluation Time (Minutes) 10[DL1.1]        Revision History        User Key Date/Time User Provider Type Action    > DL1.1 3/30/2017  4:57 PM Antonina Farr, OT Occupational Therapist Sign            Progress Notes by Maribell Roth, PT at 3/30/2017  2:41 PM     Author:  Maribell Roth, PT Service:  (none) Author Type:  Physical Therapist    Filed:  3/30/2017  2:41 PM Date of Service:  3/30/2017  2:41 PM Note Created:  3/30/2017  2:41 PM    Status:  Signed :  Maribell Roth, PT (Physical Therapist)            03/30/17 1246   Quick Adds   Type of Visit Initial PT Evaluation       Present no   Language english   Living Environment   Lives With facility resident   Living Environment Comment [facility] Has been there since 12/8/2016.    Self-Care   Usual Activity Tolerance moderate   Current Activity Tolerance fair   Regular Exercise yes   Activity/Exercise Type other (see comments)  (PT/OT)   Exercise Amount/Frequency 5-7 times/wk   Activity/Exercise/Self-Care Comment Pt reports using a fww at [place] for ambulation and mobility. Was walking up to ~180 ft with fww and " Ax1.   Functional Level Prior   Ambulation 3-->assistive equipment and person   Transferring 3-->assistive equipment and person   Fall history within last six months yes   Number of times patient has fallen within last six months 1  (pt reports fall at TCU - but not able to clarify what happen)   Which of the above functional risks had a recent onset or change? ambulation;transferring;fall history   Prior Functional Level Comment Most recent level of function was Ax1 with fww at TCU   General Information   Onset of Illness/Injury or Date of Surgery - Date 03/28/17   Referring Physician Xiomara Zacarias PA-C   Patient/Family Goals Statement None stated   Pertinent History of Current Problem (include personal factors and/or comorbidities that impact the POC) 66 year old female with a past medical history of DM2, renal cancer s/p left nephrectomy, CKD stage 3 (of single kidney), HEADLEY cirrhosis c/b HE, EV s/p banding in 10/2016 and refractory ascites s/p TIPS 11/2016 who is admitted for AMS, found to have HE   Precautions/Limitations fall precautions   General Observations Pt supine in bed upon arrival. Agreeable to therapy session.    General Info Comments Activity Orders: Up with assist.    Cognitive Status Examination   Orientation orientation to person, place and time   Level of Consciousness alert   Follows Commands and Answers Questions 100% of the time   Personal Safety and Judgment impulsive   Cognitive Comment Pt appropriately responds to questions and follows commands. Occasional illogical statements, but re-orients quickly.    Pain Assessment   Patient Currently in Pain No   Posture    Posture Forward head position;Protracted shoulders   Range of Motion (ROM)   ROM Comment B LE WFL   Strength   Strength Comments Demonstrates at least 3/5 B LE strength with functional mobility and transfers   Bed Mobility   Bed Mobility Comments Supine>sitting EOB with Willa and HOB elevated. Use of bed railing   Transfer Skills    Transfer Comments Sit<>stand with Willa of 2 and fww   Gait   Gait Comments Ambulates with fww and CGA of 2. Slow shuffled steps. Significantly decreased gait speed.   Balance   Balance Comments Seated EOB: good. Standing: good/fair with use of fww - CGA for safety   Sensory Examination   Sensory Perception Comments Denies any numbness or tingling in B LE   General Therapy Interventions   Planned Therapy Interventions balance training;bed mobility training;gait training;neuromuscular re-education;ROM;strengthening;stretching;transfer training;home program guidelines;progressive activity/exercise   Clinical Impression   Criteria for Skilled Therapeutic Intervention yes, treatment indicated   PT Diagnosis Decreased functional mobility   Influenced by the following impairments Decreased strength and activity tolerance, impaired balance   Functional limitations due to impairments Impaired ability to functionally navigate in home or community   Clinical Presentation Evolving/Changing   Clinical Presentation Rationale PMH, medical course   Clinical Decision Making (Complexity) Moderate complexity   Therapy Frequency` 5 times/week   Predicted Duration of Therapy Intervention (days/wks) 4/5/2017   Anticipated Equipment Needs at Discharge (TBD)   Anticipated Discharge Disposition Transitional Care Facility   Risk & Benefits of therapy have been explained Yes   Patient, Family & other staff in agreement with plan of care Yes   Total Evaluation Time   Total Evaluation Time (Minutes) 6[SG1.1]        Revision History        User Key Date/Time User Provider Type Action    > SG1.1 3/30/2017  2:41 PM Maribell Roth, PT Physical Therapist Sign                                                      INTERAGENCY TRANSFER FORM - LAB / IMAGING / EKG / EMG RESULTS   3/28/2017                       [facility] [facility] BANK: [phone]            Unresulted Labs (24h ago through future)    Start       Ordered    03/30/17 0530  CBC with  platelets  AM DRAW,   Routine     Comments:  Last Lab Result: Hemoglobin (g/dL)       Date                     Value                 03/29/2017               10.2 (L)         ----------    03/29/17 1529    03/30/17 0530  Comprehensive metabolic panel  AM DRAW,   Routine      03/29/17 1529    03/30/17 0530  INR  AM DRAW,   Routine      03/29/17 1529         Lab Results - 3 Days      Glucose by meter [[id number]] (Abnormal)  Resulted: 03/31/17 0940, Result status: Final result    Ordering provider: Esa Manzo MD  03/31/17 0934 Resulting lab: POINT OF CARE TEST, GLUCOSE    Specimen Information    Type Source Collected On     03/31/17 0934          Components       Value Reference Range Flag Lab   Glucose 188 70 - 99 mg/dL H 170            Glucose by meter [[id number]] (Abnormal)  Resulted: 03/31/17 0835, Result status: Final result    Ordering provider: Esa Manzo MD  03/31/17 0821 Resulting lab: POINT OF CARE TEST, GLUCOSE    Specimen Information    Type Source Collected On     03/31/17 0821          Components       Value Reference Range Flag Lab   Glucose 125 70 - 99 mg/dL H 170            Glucose by meter [[id number]] (Abnormal)  Resulted: 03/31/17 0741, Result status: Final result    Ordering provider: Esa Manzo MD  03/31/17 0700 Resulting lab: POINT OF CARE TEST, GLUCOSE    Specimen Information    Type Source Collected On     03/31/17 0700          Components       Value Reference Range Flag Lab   Glucose 115 70 - 99 mg/dL H 170            Comprehensive metabolic panel [[id number]] (Abnormal)  Resulted: 03/31/17 0717, Result status: Final result    Ordering provider: Rosa Dunne PA-C  03/30/17 2334 Resulting lab: [facility]    Specimen Information    Type Source Collected On   Blood  03/31/17 0635          Components       Value Reference Range Flag Lab   Sodium 136 133 - 144 mmol/L  51   Potassium 4.0 3.4 - 5.3 mmol/L  51    Chloride 102 94 - 109 mmol/L  51   Carbon Dioxide 25 20 - 32 mmol/L  51   Anion Gap 8 3 - 14 mmol/L  51   Glucose 114 70 - 99 mg/dL H 51   Urea Nitrogen 31 7 - 30 mg/dL H 51   Creatinine 1.50 0.52 - 1.04 mg/dL H 51   GFR Estimate 35 >60 mL/min/1.7m2 L 51   Comment:  Non  GFR Calc   GFR Estimate If Black 42 >60 mL/min/1.7m2 L 51   Comment:  African American GFR Calc   Calcium 7.8 8.5 - 10.1 mg/dL L 51   Bilirubin Total 1.3 0.2 - 1.3 mg/dL  51   Albumin 2.3 3.4 - 5.0 g/dL L 51   Protein Total 5.6 6.8 - 8.8 g/dL L 51   Alkaline Phosphatase 99 40 - 150 U/L  51   ALT 17 0 - 50 U/L  51   AST 20 0 - 45 U/L  51            INR [[id number]] (Abnormal)  Resulted: 03/31/17 0702, Result status: Final result    Ordering provider: Rosa Dunne PA-C  03/30/17 2330 Resulting lab: [facility]    Specimen Information    Type Source Collected On   Blood  03/31/17 0635          Components       Value Reference Range Flag Lab   INR 1.23 0.86 - 1.14 H 51            CBC with platelets [[id number]] (Abnormal)  Resulted: 03/31/17 0651, Result status: Final result    Ordering provider: Rosa Dunne PA-C  03/30/17 2330 Resulting lab: [facility]    Specimen Information    Type Source Collected On   Blood  03/31/17 0635          Components       Value Reference Range Flag Lab   WBC 8.3 4.0 - 11.0 10e9/L  51   RBC Count 3.42 3.8 - 5.2 10e12/L L 51   Hemoglobin 9.6 11.7 - 15.7 g/dL L 51   Hematocrit 29.3 35.0 - 47.0 % L 51   MCV 86 78 - 100 fl  51   MCH 28.1 26.5 - 33.0 pg  51   MCHC 32.8 31.5 - 36.5 g/dL  51   RDW 15.7 10.0 - 15.0 % H 51   Platelet Count 141 150 - 450 10e9/L L 51            Glucose by meter [[id number]] (Abnormal)  Resulted: 03/31/17 0620, Result status: Final result    Ordering provider: Esa Manzo MD  03/31/17 0612 Resulting lab: POINT OF CARE TEST, GLUCOSE    Specimen Information    Type Source Collected On      03/31/17 0612          Components       Value Reference Range Flag Lab   Glucose 116 70 - 99 mg/dL H 170            Blood culture [[id number]]  Resulted: 03/31/17 0516, Result status: Preliminary result    Ordering provider: Priya Allen MD  03/28/17 1255 Resulting lab: [facility]    Specimen Information    Type Source Collected On   Blood Arm, Left 03/28/17 1330          Components       Value Reference Range Flag Lab   Specimen Description Blood Right Arm   51   Culture Micro No growth after 3 days   225   Micro Report Status Pending   225            Blood culture [[id number]]  Resulted: 03/31/17 0516, Result status: Preliminary result    Ordering provider: Priya Allen MD  03/28/17 1255 Resulting lab: [facility]    Specimen Information    Type Source Collected On   Blood Arm, Right 03/28/17 1330          Components       Value Reference Range Flag Lab   Specimen Description Blood Left Arm   51   Culture Micro No growth after 3 days   225   Micro Report Status Pending   225            Glucose by meter [[id number]] (Abnormal)  Resulted: 03/31/17 0505, Result status: Final result    Ordering provider: Esa Manzo MD  03/31/17 0459 Resulting lab: POINT OF CARE TEST, GLUCOSE    Specimen Information    Type Source Collected On     03/31/17 0459          Components       Value Reference Range Flag Lab   Glucose 108 70 - 99 mg/dL H 170            Glucose by meter [[id number]] (Abnormal)  Resulted: 03/31/17 0410, Result status: Final result    Ordering provider: Esa Manzo MD  03/31/17 0406 Resulting lab: POINT OF CARE TEST, GLUCOSE    Specimen Information    Type Source Collected On     03/31/17 0406          Components       Value Reference Range Flag Lab   Glucose 132 70 - 99 mg/dL H 170            Glucose by meter [[id number]] (Abnormal)  Resulted: 03/31/17 0300, Result status: Final result    Ordering provider:  Esa Manzo MD  03/31/17 0254 Resulting lab: POINT OF CARE TEST, GLUCOSE    Specimen Information    Type Source Collected On     03/31/17 0254          Components       Value Reference Range Flag Lab   Glucose 165 70 - 99 mg/dL H 170            Glucose by meter [[id number]] (Abnormal)  Resulted: 03/31/17 0210, Result status: Final result    Ordering provider: Esa Manzo MD  03/31/17 0204 Resulting lab: POINT OF CARE TEST, GLUCOSE    Specimen Information    Type Source Collected On     03/31/17 0204          Components       Value Reference Range Flag Lab   Glucose 145 70 - 99 mg/dL H 170            Glucose by meter [[id number]] (Abnormal)  Resulted: 03/31/17 0110, Result status: Final result    Ordering provider: Esa Manzo MD  03/31/17 0104 Resulting lab: POINT OF CARE TEST, GLUCOSE    Specimen Information    Type Source Collected On     03/31/17 0104          Components       Value Reference Range Flag Lab   Glucose 124 70 - 99 mg/dL H 170            Glucose by meter [[id number]] (Abnormal)  Resulted: 03/31/17 0011, Result status: Final result    Ordering provider: Esa Manzo MD  03/31/17 0002 Resulting lab: POINT OF CARE TEST, GLUCOSE    Specimen Information    Type Source Collected On     03/31/17 0002          Components       Value Reference Range Flag Lab   Glucose 136 70 - 99 mg/dL H 170            Glucose by meter [[id number]] (Abnormal)  Resulted: 03/30/17 2255, Result status: Final result    Ordering provider: Esa Manzo MD  03/30/17 2249 Resulting lab: POINT OF CARE TEST, GLUCOSE    Specimen Information    Type Source Collected On     03/30/17 2249          Components       Value Reference Range Flag Lab   Glucose 148 70 - 99 mg/dL H 170            Glucose by meter [[id number]] (Abnormal)  Resulted: 03/30/17 2220, Result status: Final result    Ordering provider: Esa Manzo MD  03/30/17 2209 Resulting lab: POINT OF CARE TEST, GLUCOSE     Specimen Information    Type Source Collected On     03/30/17 2209          Components       Value Reference Range Flag Lab   Glucose 157 70 - 99 mg/dL H 170            Glucose by meter [[id number]] (Abnormal)  Resulted: 03/30/17 2216, Result status: Final result    Ordering provider: Esa Manzo MD  03/30/17 1856 Resulting lab: POINT OF CARE TEST, GLUCOSE    Specimen Information    Type Source Collected On     03/30/17 1856          Components       Value Reference Range Flag Lab   Glucose 451 70 - 99 mg/dL H 170            Glucose by meter [[id number]] (Abnormal)  Resulted: 03/30/17 2216, Result status: Final result    Ordering provider: Esa Manzo MD  03/30/17 2006 Resulting lab: POINT OF CARE TEST, GLUCOSE    Specimen Information    Type Source Collected On     03/30/17 2006          Components       Value Reference Range Flag Lab   Glucose 334 70 - 99 mg/dL H 170            Glucose by meter [[id number]] (Abnormal)  Resulted: 03/30/17 2215, Result status: Final result    Ordering provider: Esa Manzo MD  03/30/17 1745 Resulting lab: POINT OF CARE TEST, GLUCOSE    Specimen Information    Type Source Collected On     03/30/17 1745          Components       Value Reference Range Flag Lab   Glucose 449 70 - 99 mg/dL H 170            Urine Culture [[id number]] (Abnormal)  Resulted: 03/30/17 2212, Result status: Final result    Ordering provider: Priya Allen MD  03/28/17 1255 Resulting lab: [facility]    Specimen Information    Type Source Collected On   Urine Urine catheter 03/28/17 1746          Components       Value Reference Range Flag Lab   Specimen Description Unspecified Urine   75   Special Requests Specimen received in preservative   75   Culture Micro >100,000 colonies/mL Enterococcus faecium  A 225   Micro Report Status FINAL 03/30/2017   225   Organism: >100,000 colonies/mL Enterococcus faecium   225            Glucose by meter  [[id number]] (Abnormal)  Resulted: 03/30/17 2115, Result status: Final result    Ordering provider: Esa Manzo MD  03/30/17 2109 Resulting lab: POINT OF CARE TEST, GLUCOSE    Specimen Information    Type Source Collected On     03/30/17 2109          Components       Value Reference Range Flag Lab   Glucose 217 70 - 99 mg/dL H 170            Glucose by meter [[id number]] (Abnormal)  Resulted: 03/30/17 1705, Result status: Final result    Ordering provider: Esa Manzo MD  03/30/17 1701 Resulting lab: POINT OF CARE TEST, GLUCOSE    Specimen Information    Type Source Collected On     03/30/17 1701          Components       Value Reference Range Flag Lab   Glucose 436 70 - 99 mg/dL H 170            Glucose by meter [[id number]] (Abnormal)  Resulted: 03/30/17 1606, Result status: Final result    Ordering provider: Esa Manzo MD  03/30/17 1559 Resulting lab: POINT OF CARE TEST, GLUCOSE    Specimen Information    Type Source Collected On     03/30/17 1559          Components       Value Reference Range Flag Lab   Glucose 481 70 - 99 mg/dL H 170            Glucose by meter [[id number]] (Abnormal)  Resulted: 03/30/17 1501, Result status: Final result    Ordering provider: Esa Manzo MD  03/30/17 1456 Resulting lab: POINT OF CARE TEST, GLUCOSE    Specimen Information    Type Source Collected On     03/30/17 1456          Components       Value Reference Range Flag Lab   Glucose 514 70 - 99 mg/dL             Glucose by meter [[id number]] (Abnormal)  Resulted: 03/30/17 1431, Result status: Final result    Ordering provider: Esa Manzo MD  03/30/17 1424 Resulting lab: POINT OF CARE TEST, GLUCOSE    Specimen Information    Type Source Collected On     03/30/17 1424          Components       Value Reference Range Flag Lab   Glucose 500 70 - 99 mg/dL H 170            Glucose by meter [[id number]] (Abnormal)  Resulted: 03/30/17 1120, Result status: Final result     Ordering provider: Esa Manzo MD  03/30/17 1115 Resulting lab: POINT OF CARE TEST, GLUCOSE    Specimen Information    Type Source Collected On     03/30/17 1115          Components       Value Reference Range Flag Lab   Glucose 442 70 - 99 mg/dL H 170            Comprehensive metabolic panel [[id number]] (Abnormal)  Resulted: 03/30/17 0624, Result status: Final result    Ordering provider: Rosa Dunne PA-C  03/29/17 2330 Resulting lab: [facility]    Specimen Information    Type Source Collected On   Blood  03/30/17 0549          Components       Value Reference Range Flag Lab   Sodium 139 133 - 144 mmol/L  51   Potassium 4.1 3.4 - 5.3 mmol/L  51   Chloride 104 94 - 109 mmol/L  51   Carbon Dioxide 27 20 - 32 mmol/L  51   Anion Gap 8 3 - 14 mmol/L  51   Glucose 260 70 - 99 mg/dL H 51   Urea Nitrogen 37 7 - 30 mg/dL H 51   Creatinine 1.69 0.52 - 1.04 mg/dL H 51   GFR Estimate 30 >60 mL/min/1.7m2 L 51   Comment:  Non  GFR Calc   GFR Estimate If Black 37 >60 mL/min/1.7m2 L 51   Comment:  African American GFR Calc   Calcium 8.5 8.5 - 10.1 mg/dL  51   Bilirubin Total 1.3 0.2 - 1.3 mg/dL  51   Albumin 2.5 3.4 - 5.0 g/dL L 51   Protein Total 6.1 6.8 - 8.8 g/dL L 51   Alkaline Phosphatase 93 40 - 150 U/L  51   ALT 16 0 - 50 U/L  51   AST 22 0 - 45 U/L  51            INR [[id number]] (Abnormal)  Resulted: 03/30/17 0615, Result status: Final result    Ordering provider: Rosa Dunne PA-C  03/29/17 2330 Resulting lab: [facility]    Specimen Information    Type Source Collected On   Blood  03/30/17 0549          Components       Value Reference Range Flag Lab   INR 1.24 0.86 - 1.14 H 51            CBC with platelets [[id number]] (Abnormal)  Resulted: 03/30/17 0603, Result status: Final result    Ordering provider: Rosa Dunne PA-C  03/29/17 0157 Resulting lab: [facility]    Specimen Information    Type Source Collected On   Blood  03/30/17 0549          Components       Value Reference Range Flag Lab   WBC 8.4 4.0 - 11.0 10e9/L  51   RBC Count 3.52 3.8 - 5.2 10e12/L L 51   Hemoglobin 9.9 11.7 - 15.7 g/dL L 51   Hematocrit 31.3 35.0 - 47.0 % L 51   MCV 89 78 - 100 fl  51   MCH 28.1 26.5 - 33.0 pg  51   MCHC 31.6 31.5 - 36.5 g/dL  51   RDW 16.1 10.0 - 15.0 % H 51   Platelet Count 172 150 - 450 10e9/L  51            Glucose by meter [[id number]] (Abnormal)  Resulted: 03/30/17 0426, Result status: Final result    Ordering provider: Esa Manzo MD  03/30/17 0054 Resulting lab: POINT OF CARE TEST, GLUCOSE    Specimen Information    Type Source Collected On     03/30/17 0054          Components       Value Reference Range Flag Lab   Glucose 299 70 - 99 mg/dL H 170            Glucose by meter [[id number]] (Abnormal)  Resulted: 03/30/17 0426, Result status: Final result    Ordering provider: Esa Manzo MD  03/30/17 0303 Resulting lab: POINT OF CARE TEST, GLUCOSE    Specimen Information    Type Source Collected On     03/30/17 0303          Components       Value Reference Range Flag Lab   Glucose 259 70 - 99 mg/dL H 170            Glucose by meter [[id number]] (Abnormal)  Resulted: 03/29/17 2006, Result status: Final result    Ordering provider: Esa Manzo MD  03/29/17 1900 Resulting lab: POINT OF CARE TEST, GLUCOSE    Specimen Information    Type Source Collected On     03/29/17 1900          Components       Value Reference Range Flag Lab   Glucose 169 70 - 99 mg/dL H 170            Glucose by meter [[id number]] (Abnormal)  Resulted: 03/29/17 1551, Result status: Final result    Ordering provider: Esa Manzo MD  03/29/17 1546 Resulting lab: POINT OF CARE TEST, GLUCOSE    Specimen Information    Type Source Collected On     03/29/17 1546          Components       Value Reference Range Flag Lab   Glucose 145 70 - 99 mg/dL H 170             Glucose by meter [[id number]] (Abnormal)  Resulted: 03/29/17 1211, Result status: Final result    Ordering provider: Esa Manzo MD  03/29/17 1207 Resulting lab: POINT OF CARE TEST, GLUCOSE    Specimen Information    Type Source Collected On     03/29/17 1207          Components       Value Reference Range Flag Lab   Glucose 127 70 - 99 mg/dL H 170            Glucose by meter [[id number]] (Abnormal)  Resulted: 03/29/17 1126, Result status: Final result    Ordering provider: Esa Manzo MD  03/29/17 1121 Resulting lab: POINT OF CARE TEST, GLUCOSE    Specimen Information    Type Source Collected On     03/29/17 1121          Components       Value Reference Range Flag Lab   Glucose 118 70 - 99 mg/dL H 170            Procalcitonin [[id number]]  Resulted: 03/29/17 1122, Result status: Final result    Ordering provider: Xiomara Zacarias PA-C  03/29/17 0530 Resulting lab: [facility]    Specimen Information    Type Source Collected On     03/29/17 0720          Components       Value Reference Range Flag Lab   Procalcitonin 0.09 ng/ml  51   Comment:         0.05-0.24 ng/ml Low risk of systemic bacterial infection. Local bacterial   infection possible.  Recommendation: Assess other clinical features of   infection. Discourage antibiotics unless strong clinical suspicion for   serious   infection.              Glucose by meter [[id number]] (Abnormal)  Resulted: 03/29/17 0840, Result status: Final result    Ordering provider: Esa Manzo MD  03/29/17 0836 Resulting lab: POINT OF CARE TEST, GLUCOSE    Specimen Information    Type Source Collected On     03/29/17 0836          Components       Value Reference Range Flag Lab   Glucose 109 70 - 99 mg/dL H 170            Comprehensive metabolic panel [[id number]] (Abnormal)  Resulted: 03/29/17 0759, Result status: Final result    Ordering provider: Xiomara Zacarias PA-C  03/28/17 2330 Resulting lab: [facility]    Specimen Information    Type Source Collected On   Blood  03/29/17 0720          Components       Value Reference Range Flag Lab   Sodium 146 133 - 144 mmol/L H 51   Potassium 3.7 3.4 - 5.3 mmol/L  51   Chloride 109 94 - 109 mmol/L  51   Carbon Dioxide 28 20 - 32 mmol/L  51   Anion Gap 9 3 - 14 mmol/L  51   Glucose 121 70 - 99 mg/dL H 51   Urea Nitrogen 35 7 - 30 mg/dL H 51   Creatinine 1.91 0.52 - 1.04 mg/dL H 51   GFR Estimate 26 >60 mL/min/1.7m2 L 51   Comment:  Non  GFR Calc   GFR Estimate If Black 32 >60 mL/min/1.7m2 L 51   Comment:  African American GFR Calc   Calcium 8.6 8.5 - 10.1 mg/dL  51   Bilirubin Total 1.4 0.2 - 1.3 mg/dL H 51   Albumin 2.5 3.4 - 5.0 g/dL L 51   Protein Total 6.1 6.8 - 8.8 g/dL L 51   Alkaline Phosphatase 97 40 - 150 U/L  51   ALT 14 0 - 50 U/L  51   AST 22 0 - 45 U/L  51            INR [[id number]] (Abnormal)  Resulted: 03/29/17 0739, Result status: Final result    Ordering provider: Xiomara Zacarias PA-C  03/28/17 2330 Resulting lab: [facility]    Specimen Information    Type Source Collected On   Blood  03/29/17 0720          Components       Value Reference Range Flag Lab   INR 1.25 0.86 - 1.14 H 51            CBC with platelets [[id number]] (Abnormal)  Resulted: 03/29/17 0734, Result status: Final result    Ordering provider: Xiomara Zacarias PA-C  03/28/17 2330 Resulting lab: [facility]    Specimen Information    Type Source Collected On   Blood  03/29/17 0720          Components       Value Reference Range Flag Lab   WBC 8.1 4.0 - 11.0 10e9/L  51   RBC Count 3.68 3.8 - 5.2 10e12/L L 51   Hemoglobin 10.2 11.7 - 15.7 g/dL L 51   Hematocrit 32.5 35.0 - 47.0 % L 51   MCV 88 78 - 100 fl  51   MCH 27.7 26.5 - 33.0 pg  51   MCHC 31.4 31.5 - 36.5 g/dL L 51   RDW 16.2 10.0 - 15.0 % H 51   Platelet Count 157 150 - 450 10e9/L  51            Glucose by meter [[id number]]  (Abnormal)  Resulted: 03/29/17 0345, Result status: Final result    Ordering provider: Esa Manzo MD  03/29/17 0341 Resulting lab: POINT OF CARE TEST, GLUCOSE    Specimen Information    Type Source Collected On     03/29/17 0341          Components       Value Reference Range Flag Lab   Glucose 103 70 - 99 mg/dL H 170            Glucose by meter [[id number]] (Abnormal)  Resulted: 03/28/17 2321, Result status: Final result    Ordering provider: Esa Manzo MD  03/28/17 2317 Resulting lab: POINT OF CARE TEST, GLUCOSE    Specimen Information    Type Source Collected On     03/28/17 2317          Components       Value Reference Range Flag Lab   Glucose 124 70 - 99 mg/dL H 170            Glucose by meter [[id number]] (Abnormal)  Resulted: 03/28/17 2030, Result status: Final result    Ordering provider: Esa Manzo MD  03/28/17 2024 Resulting lab: POINT OF CARE TEST, GLUCOSE    Specimen Information    Type Source Collected On     03/28/17 2024          Components       Value Reference Range Flag Lab   Glucose 123 70 - 99 mg/dL H 170            Glucose by meter [[id number]] (Abnormal)  Resulted: 03/28/17 1935, Result status: Final result    Ordering provider: Priya Allen MD  03/28/17 1925 Resulting lab: POINT OF CARE TEST, GLUCOSE    Specimen Information    Type Source Collected On     03/28/17 1925          Components       Value Reference Range Flag Lab   Glucose 139 70 - 99 mg/dL H 170            UA with Microscopic [[id number]] (Abnormal)  Resulted: 03/28/17 1859, Result status: Final result    Ordering provider: Priya Allen MD  03/28/17 1255 Resulting lab: [facility]    Specimen Information    Type Source Collected On   Urine  03/28/17 1746          Components       Value Reference Range Flag Lab   Color Urine Light Yellow   51   Appearance Urine Clear   51   Glucose Urine Negative NEG mg/dL  51   Bilirubin Urine Negative NEG   51   Ketones Urine Negative NEG mg/dL  51   Specific Gravity Urine 1.007 1.003 - 1.035  51   Blood Urine Small NEG A 51   pH Urine 7.5 5.0 - 7.0 pH H 51   Protein Albumin Urine Negative NEG mg/dL  51   Urobilinogen mg/dL Normal 0.0 - 2.0 mg/dL  51   Nitrite Urine Negative NEG  51   Leukocyte Esterase Urine Negative NEG  51   Source Catheterized Urine   51   WBC Urine 3 0 - 2 /HPF H 51   RBC Urine 1 0 - 2 /HPF  51   Squamous Epithelial /HPF Urine <1 0 - 1 /HPF  51            Ammonia [[id number]] (Abnormal)  Resulted: 03/28/17 1414, Result status: Final result    Ordering provider: John Mckeon MD  03/28/17 1222 Resulting lab: [facility]    Specimen Information    Type Source Collected On   Blood  03/28/17 1330          Components       Value Reference Range Flag Lab   Ammonia 194 10 - 50 umol/L HH 51   Comment:         Critical Value called to and read back by  NOTIFIED INHIDINE SHERWIN [id number] AT 1414 ON ER BY EK              INR [[id number]] (Abnormal)  Resulted: 03/28/17 1324, Result status: Final result    Ordering provider: John Mckeon MD  03/28/17 1217 Resulting lab: [facility]    Specimen Information    Type Source Collected On     03/28/17 1217          Components       Value Reference Range Flag Lab   INR 1.21 0.86 - 1.14 H 51            Comprehensive metabolic panel [[id number]] (Abnormal)  Resulted: 03/28/17 1258, Result status: Final result    Ordering provider: John Mckeon MD  03/28/17 1222 Resulting lab: [facility]    Specimen Information    Type Source Collected On   Blood  03/28/17 1217          Components       Value Reference Range Flag Lab   Sodium 145 133 - 144 mmol/L H 51   Potassium 4.0 3.4 - 5.3 mmol/L  51   Chloride 107 94 - 109 mmol/L  51   Carbon Dioxide 29 20 - 32 mmol/L  51   Anion Gap 10 3 - 14 mmol/L  51   Glucose 122 70 - 99 mg/dL H 51   Urea Nitrogen 34 7 - 30 mg/dL H 51    Creatinine 1.88 0.52 - 1.04 mg/dL H 51   GFR Estimate 27 >60 mL/min/1.7m2 L 51   Comment:  Non  GFR Calc   GFR Estimate If Black 32 >60 mL/min/1.7m2 L 51   Comment:  African American GFR Calc   Calcium 8.4 8.5 - 10.1 mg/dL L 51   Bilirubin Total 0.9 0.2 - 1.3 mg/dL  51   Albumin 2.5 3.4 - 5.0 g/dL L 51   Protein Total 6.0 6.8 - 8.8 g/dL L 51   Alkaline Phosphatase 112 40 - 150 U/L  51   ALT 15 0 - 50 U/L  51   AST 19 0 - 45 U/L  51            Lactic acid whole blood [[id number]]  Resulted: 03/28/17 1253, Result status: Final result    Ordering provider: John Mckeon MD  03/28/17 1222 Resulting lab: [facility]    Specimen Information    Type Source Collected On   Blood  03/28/17 1217          Components       Value Reference Range Flag Lab   Lactic Acid 1.6 0.7 - 2.1 mmol/L  51            CBC with platelets differential [[id number]] (Abnormal)  Resulted: 03/28/17 1241, Result status: Final result    Ordering provider: John Mckeon MD  03/28/17 1222 Resulting lab: [facility]    Specimen Information    Type Source Collected On   Blood  03/28/17 1217          Components       Value Reference Range Flag Lab   WBC 5.3 4.0 - 11.0 10e9/L  51   RBC Count 3.62 3.8 - 5.2 10e12/L L 51   Hemoglobin 10.1 11.7 - 15.7 g/dL L 51   Hematocrit 31.9 35.0 - 47.0 % L 51   MCV 88 78 - 100 fl  51   MCH 27.9 26.5 - 33.0 pg  51   MCHC 31.7 31.5 - 36.5 g/dL  51   RDW 16.1 10.0 - 15.0 % H 51   Platelet Count 158 150 - 450 10e9/L  51   Diff Method Automated Method   51   % Neutrophils 66.7 %  51   % Lymphocytes 14.4 %  51   % Monocytes 12.4 %  51   % Eosinophils 5.5 %  51   % Basophils 0.8 %  51   % Immature Granulocytes 0.2 %  51   Nucleated RBCs 0 0 /100  51   Absolute Neutrophil 3.5 1.6 - 8.3 10e9/L  51   Absolute Lymphocytes 0.8 0.8 - 5.3 10e9/L  51   Absolute Monocytes 0.7 0.0 - 1.3 10e9/L  51   Absolute Eosinophils 0.3 0.0 - 0.7 10e9/L  51   Absolute  Basophils 0.0 0.0 - 0.2 10e9/L  51   Abs Immature Granulocytes 0.0 0 - 0.4 10e9/L  51   Absolute Nucleated RBC 0.0   51            Testing Performed By     Lab - Abbreviation Name Director Address Valid Date Range    51 - Unknown [facility] Unknown [address] [facility] [id number] 12/31/14 1010 - Present    75 - Unknown [facility] Unknown [address] [facility] [address] 01/15/15 1019 - Present    170 - Unknown POINT OF CARE TEST, GLUCOSE Unknown Unknown 10/31/11 1114 - Present    225 - Unknown INFECTIOUS DISEASE DIAGNOSTIC LABORATORY Unknown [address] [facility] [address] 12/19/14 0954 - Present               Imaging Results - 3 Days      US Abdomen Complete w Doppler Complete [[id number]]  Resulted: 03/30/17 0959, Result status: Final result    Ordering provider: Xiomara Zacarias PA-C  03/28/17 1636 Resulted by: Jesus Manuel Carvalho MD Schat, Robben, MD    Performed: 03/28/17 1913 - 03/28/17 1924 Resulting lab: RADIOLOGY RESULTS    Narrative:       EXAMINATION: US ABDOMEN COMPLETE WITH DOPPLER COMPLETE, 3/28/2017 7:24  PM     COMPARISON: 3/10/2017 abdominal ultrasound    HISTORY: HE, s/p TIPS- eval TIPS    TECHNIQUE:  Grey-scale, color Doppler and spectral flow analysis.    Findings:     Liver: The liver is shrunken and the hepatic parenchyma demonstrates  coarsened echotexture and a nodular contour without evidence for focal  mass. The main portal vein is patent with antegrade flow.    Gallbladder: The gallbladder is well distended and of normal  morphology. There is no wall thickening, pericholecystic fluid,  sonographic Villalta's sign nor evidence for cholelithiasis.    Bile Ducts: Both the intra and extrahepatic biliary system are of  normal caliber with the common duct measuring 4 mm in diameter.    Pancreas: Visualized portions of the head and body of the pancreas are  unremarkable. No peripancreatic fluid is  visualized.    Kidneys: The right kidney is of normal echotexture, without mass nor  hydronephrosis.  The craniocaudal dimension of the right kidney is  10.2 cm. The left kidney was not well visualized.    Spleen: The spleen is mildly enlarged and measures 14.4 cm in sagittal  dimension.    Aorta and IVC: The aorta was not well-visualized. The inferior vena  cava is of normal caliber, measuring 2.4 cm.    Fluid: Small to moderate ascites.    DOPPLER:     There is antegrade flow in the main portal vein:  18 cm/sec in the main portal vein  To and fro flow the right portal vein with antegrade flow measuring up  to 12 cm/s.   No flow could be demonstrated in the left portal vein with color and  spectral Doppler imaging.     There is flow antegrade in the hepatic artery:  119 cm/sec peak systolic flow  11 cm/sec minimum diastolic flow   0.90 resistive index     The stent velocities are  52 cm/sec at the portal vein  161 cm/sec in mid stent  145 cm/sec in the middle hepatic vein distal shunt end.     The splenic vein is patent and flow is towards the liver.     The left, middle, and right hepatic veins are patent with flow towards  the IVC.       Impression:       Impression:   1. Patent TIPS. However, increase in in-stent velocities from 52 cm/s  at the portal venous end to 161 cm/s at mid stent. This is technically  within normal range but somewhat concerning for stenosis with  impending TIPS failure. To and fro flow in the right portal vein and  lack of demonstrable flow in the left portal vein (previously  retrograde on 3/10/2017, as expected) argues for this as well. Close  follow up again recommended.  2. Cirrhosis with evidence of portal hypertension, including ascites  and splenomegaly. Ascites appears similar to 3/10/2017.      I have personally reviewed the examination and initial interpretation  and I agree with the findings.    KARIN CROWLEY      XR Chest Port 1 View [[id number]]  Resulted: 03/28/17 1400,  Result status: Final result    Ordering provider: Priya Allen MD  03/28/17 1255 Resulted by: Kortney Shah MD Pogatchnik, Brian P, MD    Performed: 03/28/17 1302 - 03/28/17 1313 Resulting lab: RADIOLOGY RESULTS    Narrative:       XR CHEST PORT 1 VW  3/28/2017 1:13 PM      HISTORY: ams    COMPARISON: 1/6/2017    FINDINGS: Portable AP view of the chest. Unchanged moderate left-sided  pleural effusion with overlying airspace opacities. No significant  change in the diffuse hazy opacities. Small right pleural effusion.  Heart size is normal. Thoracic aortic calcifications. TIPS, unchanged  in position.      Impression:       IMPRESSION:   1. Unchanged moderate left-sided pleural effusion with overlying  atelectasis or consolidation.  2. Diffuse hazy opacities consistent with pulmonary edema versus  infection. Stable small right pleural effusion.    I have personally reviewed the examination and initial interpretation  and I agree with the findings.    KORTNEY SHAH MD      Testing Performed By     Lab - Abbreviation Name Director Address Valid Date Range    104 - Rad Rslts RADIOLOGY RESULTS Unknown Unknown 02/16/05 1553 - Present            Encounter-Level Documents:     There are no encounter-level documents.      Order-Level Documents:     There are no order-level documents.

## 2017-03-28 NOTE — LETTER
Health Information Management Services               Recipient:  Robin Ryan Judaism    Sender:  MAHOGANY Doherty, LGSW  [address]   Pager [phone]  Phone [phone]    Date: March 31, 2017  Patient Name:  Ursula Crockett  Routing Message:    Discharge orders. Ride scheduled for 3:15pm. Thanks!    The documents accompanying this notice contain confidential information belonging to the sender.  This information is intended only for the use of the individual or entity named above.  The authorized recipient of this information is prohibited from disclosing this information to any other party and is required to destroy the information after its stated need has been fulfilled, unless otherwise required by state law.      If you are not the intended recipient, you are hereby notified that any disclosure, copying, distribution or action taken in reliance on the contents of these documents is strictly prohibited. If you have received this document in error, please notify [facility] immediately at [phone].  You may return the document via fax ([phone]) or return mail  (Health Information Management, , [address] [place] [address]).

## 2017-03-28 NOTE — ED NOTES
Bed: ED21  Expected date: 3/28/17  Expected time:   Means of arrival: Ambulance  Comments:  Hen 424  65 y/o female  Altered labs, lethargic  ETA: 5 min  Triaged: Yellow

## 2017-03-28 NOTE — IP AVS SNAPSHOT
MRN:[id number]                      After Visit Summary   3/28/2017    Ursula Crockett    MRN: [id number]           Thank you!     Thank you for choosing [facility] for your care. Our goal is always to provide you with excellent care. Hearing back from our patients is one way we can continue to improve our services. Please take a few minutes to complete the written survey that you may receive in the mail after you visit with us. Thank you!        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 She was treated with lactulose protocol and cleared quickly. She was also found to have  UTI with enterococcus, only sensitive to vancomycin. She was discharged back to nursing facility on 3/31 with plan to continue strict lactulose with goal of 4-5 bowel movements per day and to continue IV vancomycin x 6 more days to complete a 7d course.                  Who to Call     For medical emergencies, please call 240.  For non-urgent questions about your medical care, please call your primary care provider or clinic, [phone]          Attending Provider     Provider Specialty    Priya Allen MD Emergency Medicine    [facility], Esa Starks MD Internal Medicine    Kunal Owen MD Internal Medicine       Primary Care Provider Office Phone # Fax #    Raudel Nicholson [phone]       [facility] [place] [address]  [place] [address]        After Care Instructions      Activity - Up with nursing assistance           Additional Discharge Instructions       Please follow bowel plan:  Scheduled lactulose 20g three times daily - hold third dose if patient has already had 4 bowel movements since 0600 that day  If by 2000 each day, if patient has not had 4 bowel movements, will need an extra 20g of po lactulose            Advance Diet as Tolerated       Follow this diet upon discharge:       Moderate Consistent CHO Diet            General info for SNF       Length of Stay Estimate: Long Term Care  Condition at Discharge: Improving  Level of care:skilled   Rehabilitation Potential: Fair  Admission H&P remains valid and up-to-date: Yes  Recent Chemotherapy: N/A  Use Nursing Home Standing Orders: Yes            IV access       PICC.            Mantoux instructions       Give two-step Mantoux (PPD) Per Facility Policy Yes                  Follow-up Appointments     Follow Up and recommended labs and tests       Needs to have vancomycin level checked 4/2 and dose adjusted per pharmacy if high  Needs to have BMP check on 4/2 to monitor creatinine while on vancomycin  Needs to have CBC, CMP checked on 4/3  Follow up with primary care provider in 1-2 weeks time for routine follow up                  Your next 10 appointments already scheduled     Apr 04, 2017  7:45 AM CDT   Lab with UC LAB   [facility] ([facility])    [address] [place] [address] [id number] [phone]            Apr 04, 2017  8:50 AM CDT   (Arrive by 8:35 AM)   Return General Liver with Crystal Mckeon MD   [facility] ([facility])    [address] [place] [address]  [facility] [id number] [phone]            Jun 12, 2017  7:00 AM CDT   US ABDOMEN/PELVIS DUPLEX COMPLETE with UCUS1   [facility] US ([facility])    [address] [place] [id number] [phone]            Please bring a list of your medicines (including vitamins, minerals and over-the-counter drugs). Also, tell your doctor about any allergies you may have. Wear comfortable clothes and leave your valuables at home.  Adults: No eating or drinking for 8 hours before the exam. You may take medicine with a small sip of water.  Children: - Children 6+ years: No food or drink for 6 hours before exam. - Children 1-5 years: No food or drink for 4 hours before exam. - Infants, breast-fed: may have breast milk up to 2 hours before exam. - Infants, formula: may have bottle until 4 hours before exam.  Please call the Imaging Department at your exam site with any questions.            Jun 12, 2017  8:00 AM CDT   LAB with  LAB   [facility] ([facility])    [address] [place] [id number] [phone]           Patient must bring picture ID.  Patient should be prepared to give a urine specimen  Please do not eat 10-12 hours before your appointment if you are coming in fasting for labs on lipids, cholesterol, or glucose (sugar).  Pregnant women should follow their Care Team instructions. Water with medications is okay. Do not drink coffee or other fluids.   If you have concerns about taking  your medications, please ask at office or if scheduling via [employer], send a message by clicking on Secure Messaging, Message Your Care Team.            Jun 12, 2017  8:20 AM CDT   (Arrive by 8:05 AM)   Return Vascular Visit with Dandre Haddad MD   [facility] ([facility])    [address] [place] [id number] [phone]              Additional Services     Occupational Therapy Adult Consult       Evaluate and treat as clinically indicated.    Reason:  Continue with therapies for deconditioning            Physical Therapy Adult Consult       Evaluate and treat as clinically indicated.    Reason:  Continue with  "therapies for deconditioning                  Pending Results     Date and Time Order Name Status Description    3/28/2017 1255 Blood culture Preliminary     3/28/2017 1255 Blood culture Preliminary             Statement of Approval     Ordered          17 1242  I have reviewed and agree with all the recommendations and orders detailed in this document.  EFFECTIVE NOW     Approved and electronically signed by:  Rosa Dunne PA-C             Admission Information     Date & Time Provider Department Dept. Phone    3/28/2017 Kunal Owen MD [address] [facility] [phone]      Your Vitals Were     Blood Pressure Temperature Respirations Weight Pulse Oximetry BMI (Body Mass Index)    125/50 (BP Location: Right arm) 97.4  F (36.3  C) (Oral) 18 85.7 kg (189 lb) 95% 26.36 kg/m2      MyChart Information     [employer] lets you send messages to your doctor, view your test results, renew your prescriptions, schedule appointments and more. To sign up, go to [email or web address] . Click on \"Log in\" on the left side of the screen, which will take you to the Welcome page. Then click on \"Sign up Now\" on the right side of the page.     You will be asked to enter the access code listed below, as well as some personal information. Please follow the directions to create your username and password.     Your access code is: [email or web address]  Expires: 2017  7:31 AM     Your access code will  in 90 days. If you need help or a new code, please call your [facility] clinic or [phone].        Care EveryWhere ID     This is your Care EveryWhere ID. This could be used by other organizations to access your [facility] medical records  [id number]           Review of your medicines      START taking        Dose / Directions    vancomycin 1,250 mg   Indication:  Urinary Tract Infection   Used for:  Acute cystitis without hematuria        Dose:  1250 mg   Inject 1,250 mg into the vein every 24 hours for 6 days "   Refills:  0         CONTINUE these medicines which may have CHANGED, or have new prescriptions. If we are uncertain of the size of tablets/capsules you have at home, strength may be listed as something that might have changed.        Dose / Directions    * lactulose 10 GM/15ML solution   Commonly known as:  CHRONULAC   This may have changed:  Another medication with the same name was added. Make sure you understand how and when to take each.        Dose:  20 g   Take 30 mLs (20 g) by mouth 3 times daily   Refills:  0       * lactulose 10 GM/15ML solution   Commonly known as:  CHRONULAC   This may have changed:  You were already taking a medication with the same name, and this prescription was added. Make sure you understand how and when to take each.        Dose:  20 g   Take 30 mLs (20 g) by mouth daily as needed (If at 2000 daily, patient has not had at least 4BMs that day, then give 1 time prn dose)   Refills:  0       * Notice:  This list has 2 medication(s) that are the same as other medications prescribed for you. Read the directions carefully, and ask your doctor or other care provider to review them with you.      CONTINUE these medicines which have NOT CHANGED        Dose / Directions    * BUMEX PO        Dose:  1 mg   Take 1 mg by mouth daily   Refills:  0       * BUMETANIDE PO        Dose:  0.5 mg   Take 0.5 mg by mouth every evening   Refills:  0       insulin glargine 100 UNIT/ML injection   Commonly known as:  LANTUS   Indication:  AT BEDTIME        Dose:  32 Units   Inject 32 Units Subcutaneous 2 times daily   Refills:  0       insulin lispro 100 UNIT/ML injection   Commonly known as:  HumaLOG   Indication:  Pt uses sliding scale   Used for:  Cirrhosis of liver with ascites, unspecified hepatic cirrhosis type (H)        Dose:  10 Units   Inject 10 Units Subcutaneous 3 times daily (before meals) TID before meals,  or less 10 units; -150 12 units; -200 12 units; -250 13 units;  -300 14 units; -350 15 units; -400 16 units   Refills:  0       OMEPRAZOLE PO        Dose:  20 mg   Take 20 mg by mouth every morning   Refills:  0       RIFAXIMIN PO        Dose:  550 mg   Take 550 mg by mouth 2 times daily   Refills:  0       SPIRONOLACTONE PO        Dose:  100 mg   Take 100 mg by mouth daily   Refills:  0       ZOLOFT PO   Used for:  Cirrhosis of liver with ascites, unspecified hepatic cirrhosis type (H)        Dose:  100 mg   Take 100 mg by mouth daily   Refills:  0       * Notice:  This list has 2 medication(s) that are the same as other medications prescribed for you. Read the directions carefully, and ask your doctor or other care provider to review them with you.         Where to get your medicines      Some of these will need a paper prescription and others can be bought over the counter. Ask your nurse if you have questions.     You don't need a prescription for these medications     lactulose 10 GM/15ML solution    lactulose 10 GM/15ML solution    vancomycin 1,250 mg                Protect others around you: Learn how to safely use, store and throw away your medicines at www.disposemymeds.org.             Medication List: This is a list of all your medications and when to take them. Check marks below indicate your daily home schedule. Keep this list as a reference.      Medications           Morning Afternoon Evening Bedtime As Needed    * BUMEX PO   Take 1 mg by mouth daily                                * BUMETANIDE PO   Take 0.5 mg by mouth every evening                                insulin glargine 100 UNIT/ML injection   Commonly known as:  LANTUS   Inject 32 Units Subcutaneous 2 times daily   Last time this was given:  32 Units on 3/31/2017  8:58 AM                                insulin lispro 100 UNIT/ML injection   Commonly known as:  HumaLOG   Inject 10 Units Subcutaneous 3 times daily (before meals) TID before meals,  or less 10 units; -150 12  units; -200 12 units; -250 13 units; -300 14 units; -350 15 units; -400 16 units                                * lactulose 10 GM/15ML solution   Commonly known as:  CHRONULAC   Take 30 mLs (20 g) by mouth 3 times daily   Last time this was given:  20 g on 3/31/2017  9:00 AM                                * lactulose 10 GM/15ML solution   Commonly known as:  CHRONULAC   Take 30 mLs (20 g) by mouth daily as needed (If at 2000 daily, patient has not had at least 4BMs that day, then give 1 time prn dose)   Last time this was given:  20 g on 3/31/2017  9:00 AM                                OMEPRAZOLE PO   Take 20 mg by mouth every morning                                RIFAXIMIN PO   Take 550 mg by mouth 2 times daily   Last time this was given:  550 mg on 3/31/2017  8:58 AM                                SPIRONOLACTONE PO   Take 100 mg by mouth daily                                vancomycin 1,250 mg   Inject 1,250 mg into the vein every 24 hours for 6 days   Last time this was given:  1,250 mg on 3/31/2017  1:14 PM                                ZOLOFT PO   Take 100 mg by mouth daily                                * Notice:  This list has 4 medication(s) that are the same as other medications prescribed for you. Read the directions carefully, and ask your doctor or other care provider to review them with you.

## 2017-03-28 NOTE — ED NOTES
Disorientate, lethargic patient presents to ER triage with c/o: Ammonia of 192 per Nursing Home.  Patient maintaining own air way, RR 16 nonlabored.

## 2017-03-29 NOTE — PLAN OF CARE
Problem: Goal Outcome Summary  Goal: Goal Outcome Summary  Outcome: No Change  Neuro: Pt is obtunded at beginning of shift. Pt becoming more arousable throughout the shift. Following simple commands at 0445. Pt giving one word answers.   Cardiac: SR. HR 70-80s.         Respiratory: Sating>90% on RA.  GI/: Adequate urine output. Mendoza in place. Rectal tube patent with some leakage.  Diet/appetite: NPO.  Activity:  Assist of 2. Repositioning as needed.  Pain: No complaints of pain.    Skin: Blanchable redness to coccyx and heels, scattered bruises and scabbing.     Lactulose enemas via rectal tube given Q2 hrs. [place] stage 3. Family updated via telephone. Will continue to monitor.      R: Continue with POC. Notify primary team with changes.

## 2017-03-29 NOTE — PLAN OF CARE
Problem: Liver Failure, Acute/Chronic (Adult)  Goal: Signs and Symptoms of Listed Potential Problems Will be Absent or Manageable (Liver Failure, Acute/Chronic)  Signs and symptoms of listed potential problems will be absent or manageable by discharge/transition of care (reference Liver Failure, Acute/Chronic (Adult) CPG).   Outcome: Improving  Pt has been very alert since 1600. Pt getting agitated and wanting to eat and drink. Enema given for [place] at 1600, then rectal tube removed as pt remains alert and will be able to take oral lactulose. Pt off to situation and off on the date. Reoriented. Cross cover updated and diet ordered for pt. Will cont to closely monitor. Pt remains on RA and vital signs stable. Pt NSR to sinus arrhythmia. No other acute issues at this time. Will cont to monitor.     Just spoke with pt, pt weeping and upset that she was sick again. Pt stated that she has been taking her meds and having bowel movements and has been doing very good and Walker. Very discouraged and asked why we didn't just let her die if we were that sick. Explained that we just gave her lactulose and she turned around. Encouraged pt to speak with her doctors and son tomorrow.

## 2017-03-29 NOTE — PLAN OF CARE
Problem: Goal Outcome Summary  Goal: Goal Outcome Summary  Outcome: Improving  Pt a&ox1. Staff to anticipate needs. Denied pain throughout shift.  Continued with Lactulose enemas q2h per [place] protocol, with a score of III.  Mental alertness improved throughout shift, with continued confusion and needs a lot of stimulation and encouragement to follow simple commands.  Adequate urine and stool output.  Updated son and answered questions.  Kortney Ayala  3/29/2017  2:19 PM

## 2017-03-29 NOTE — PLAN OF CARE
Problem: Goal Outcome Summary  Goal: Goal Outcome Summary  ST 6B: Cx- re-attempted swallow evaluation this afternoon. Pt continues to have reduced MACRINA-- starting to wake up more per RN, but not yet ready for swallow evaluation. Will plan to follow up tomorrow AM as appropriate.

## 2017-03-29 NOTE — PROGRESS NOTES
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     Hepatic encephalopathy. S/p TIPS in 11/2016. Recent admission to [facility] 03/20-03/24 for HE (on lactulose), 02/25-02/28 for HE 2/2 noncompliance w/ lactulose. PTA maintained on rifaximin 550 mg BID, lactulose 30 mg TID titrated to 3-5 stools/day at NH. NH documentation w/o any missed doses of lactulose- but discussed with NH staff and they note that she hasn't been stooling. Ammonia of 194 in ED. No drugs (narcs or benzos). Infx w/u w/ CXR noting moderate left sided pluaral effusion w/ overlying atelecatsis or consolidation (unchanged), diffuse hazy opacities consistent w/ pulm edema vs infection, UA w/o e/o infection, afebrile, WBC wnl, lactic acid wnl, no ascites on bedside US to r/o dx para. Hgb stable, no e/o GIB. Recent abd US from 03/10 w/ patent tips, increased shunt velocity which could be position dependent or concerning for impending TIPS failure. VSS.  Most likely due to constipation on incorrect lactulose dosing. Mental status is slowly improving today on lactulose protocol.   - Continue lactulose protocol until patient clears   - Continue rifaximin  - Abd US pending today - if TIPs not functioning, will need to d/w IR  - Follow up on BCx x2, NGTD  - Trend CBC, CMP qd     HEADLEY cirrhosis. Follows w/ Dr. Mckeon. C/B EV s/p banding x7 10/16, portal hypertensive gastropathy, ascites s/p TIPS, requires nora once/3 weeks w/ 2 L ascitic fluid off. On bumex 1 mg qam, 0.5 mg qpm, aldactone 100 mg qam, 50 mg qafternoon,lactulose and rifaximin  as above PTA. Platelets stable, bili stable, INR stable. MELD Na 17 today. No ascites on bedside US today for para.   -Hold diuretics w/ NPO status and HE  -Could use IV lasix PRN for hypervolemia  -Lactluose protocol as above  -Will need to d/w GI potential need for SBP ppx?     Left sided pleural effusion. Present since 11/2016. ECHO 03/2017 at  w/ normal LV EF at 60% but grade II diastolic dysfunction, normal RV size and function, no valvular abnormalities. Refused thora at [facility] recently. Most likely 2/2 ascites and HEADLEY cirrhosis. Not requiring O2.   - Monitor O2 sats  - Consider thora pending symptoms  - Hold diuresis tonight while NPO  - No IVF or albumin     DM2. On Lantus 32 units BID, humalog 5-12 unit custom subq TID AC. Hgb A1C 6.9 in 04/2016. Pt NPO d/t encephalopathy. -127 over past 24h.   -Q4 hour BG checks  -High insulin resistance SSI  -Holding lantus while NPO  -Hypoglycemia protocol     ARMANDO on CKD stage 3. Baseline creat ~1.5 since 10/2016, admitted w/ creat of 1.88, elevated BUN to 34. Cr elevated to 1.91 today. ARMANDO likely pre-renal from not tolerating po intake d/t encephalopathy. UA not c/w infection. Of note patient has single kidney as she is s/p left nephrectomy in 2011.   - Cont to hold diuretics  - Will hold on albumin or fluids given L sided pleural effusion as above  - Avoid nephrotoxics  - Follow urine culture     Normocytic anemia. Hgb of 10.1 on admission which is baseline. Normal MCV. Iron studies in 04/2016 w/ low iron sat, normal IBC, normal iron. Likely due to liver disease, CKD. Hgb stable 10.2 today.      CODE: DNR/DNI per POLST dated 12/2016  FEN: NPO  DVT: Mechanical, SCDs  LINES: PIV  Disposition/Admission Status: inpatient, when medically stable, will d/c back to TCU      Rosa Dunne  Internal Medicine KAYLA Hospitalist   [facility]   Pager: [phone]    Team: Michelle Roblero 2  Page Cross Cover after 5 pm: pager [phone]  "  ___________________________________________________________________    Subjective & Interval Hx:  Janis is more alert today. She is still encephalopathic. Is able to tell me the year and her birthdate. When asked who is president? She mumbles \"president of the [facility] states.\" When asked where she is? She responds \" 1950.\" She has been getting lactulose. She shakes her head \"no\" when asked if she is in any pain, has any shortness of breath, fevers, or chills. She does not like the rectal lactulose.     Last 24 hr care team notes reviewed.   ROS:  4 point ROS including Respiratory, CV, GI and , other than that noted in the HPI, is negative.    Medications: Reviewed in EPIC. List below for reference    Physical Exam:    /49 (BP Location: Left arm)  Temp 97.5  F (36.4  C) (Oral)  Resp 16  Wt 89 kg (196 lb 3.4 oz)  SpO2 99%  BMI 27.37 kg/m2     GENERAL: Alert and oriented to person. Not oriented to place. Is able to state the year is 2017. Appears lethargic, but easily arousable.   HEENT: Anicteric sclera. MM slightly dry.   CV: RRR. S1, S2. No m/r/g.   RESPIRATORY: Effort normal on RA. Lungs CTAB with crackles appreciated LLB.   GI: Abdomen soft and non distended with normoactive bowel sounds present in all quadrants. No tenderness, rebound, guarding.   NEUROLOGICAL: No focal deficits. + Asterixis.   EXTREMITIES: No peripheral edema. Intact bilateral pedal pulses.   SKIN: No jaundice. No rashes.       "

## 2017-03-29 NOTE — PLAN OF CARE
Problem: Goal Outcome Summary  Goal: Goal Outcome Summary  SLP: Orders received for bedside swallow evaluation.  Pt lethargic and not appropriate for participation this AM.  ST to f/u with evaluation this PM as appropriate.

## 2017-03-29 NOTE — CONSULTS
GASTROENTEROLOGY CONSULTATION      Date of Admission:  3/28/2017          ASSESSMENT AND RECOMMENDATIONS:   Assessment:  66 year old female with a history of HEADLEY cirrhosis complicated by esophageal varices s/p banding 10/2016, ascites s/p TIPS 11/2016, and recent episodes of hepatic encephalopathy, renal cancer s/p left nephrectomy, and CKD III who was brought to the hospital from her TCU due to altered mental status concerning for hepatic encephalopathy. Most likely etiology for her current HE is constipation and possibly under dosing of lactulose. Medication non-adherence has been an issue in the past, however per nursing home she has been taking her lactulose and rifaximin as scheduled making this unlikely. Infectious causes have been pursued, UA non-infectious, blood cultures with NGTD. Ultrasound read is pending, if appreciable ascites present would obtain diagnostic paracentesis. GI bleed seems unlikely given stable hemoglobin and no melena/hematochezia. Swallow study is pending, if she is appropriate can begin oral lactulose, continue with enema while npo. Agree with holding diuretics as she appears to be intravascularly down (hypernatremia). Ultrasound in process to evaluate TIPS. Eventually may need discussion of goals of care given recent hospitalizations with altered mental status.     Recommendations:  -continue lactulose either via enema or orally if mental status allows  -rifaximin as able  -agree with holding diuretics  -if adequate ascites, obtain diagnostic paracentesis  -will follow up ultrasound results    Gastroenterology follow up recommendations: we will continue to follow    Thank you for involving us in this patient's care. Please do not hesitate to contact the GI service with any questions or concerns.     Pt care plan discussed with Dr. Crystal Mckeon, GI staff physician.    [address]  Internal Medicine  PGY1  -------------------------------------------------------------------------------------------------------------------          Chief Complaint:   We were asked by Xiomara Zacarias [facility] medicine to evaluate this patient with altered mental status    History is obtained from the medical record, limited history from patient given altered mental status.          History of Present Illness:   Ursula Crockett is a 66 year old female with a history of HEADLEY cirrhosis complicated by esophageal varices s/p banding 10/2016, ascites s/p TIPS 11/2016, and recent episodes of hepatic encephalopathy, renal cancer s/p left nephrectomy, and CKD III who was brought to the hospital from her TCU due to alerted mental status.    This is now Mrs. Crockett's third hospitalization in the past month with altered mental status. She was hospitalized from 2/25-2/28 with hepatic encephalopathy in the context of not taking lactulose. She was again hospitalized from 3/20-3/24 for hepatic encephalopathy with elevated ammonia. She was discharged with increased dose of lactulose. At the time of discharge her mental status was back to baseline. It appears over the past few days she was becoming increasingly altered. She was taking her lactulose but was not having bowel movements per the nursing home. No concerns for infection per nursing home.     TIPS was performed in November 2016 for recurrent ascites and fluid overload. Overall this appears to be improving. Had an ultrasound on 3/10 that showed increased intrastent velocity concerning for potential impending TIPS failure.            Past Medical History:   Reviewed and edited as appropriate  Past Medical History:   Diagnosis Date     Cirrhosis of liver (H)     due to fatty liver disease     Diabetes (H)      Esophageal varices (H)     s/p banding 10/13     GERD (gastroesophageal reflux disease)      Renal disease     mild elevation of Cr.            Past Surgical History:   Reviewed and edited  as appropriate   Past Surgical History:   Procedure Laterality Date     APPENDECTOMY       HERNIA REPAIR       HYSTERECTOMY       NEPHRECTOMY Left      TONSILLECTOMY              Previous Endoscopy:   No results found for this or any previous visit.         Social History:   Reviewed and edited as appropriate  Social History     Social History     Marital status:      Spouse name: N/A     Number of children: N/A     Years of education: N/A     Occupational History     Not on file.     Social History Main Topics     Smoking status: Former Smoker     Smokeless tobacco: Not on file     Alcohol use No     Drug use: No     Sexual activity: Not on file     Other Topics Concern     Not on file     Social History Narrative            Family History:   Reviewed and edited as appropriate  Un able to obtain due to patient altered mental status       Allergies:   Reviewed and edited as appropriate     Allergies   Allergen Reactions     Iodine Anaphylaxis     Talwin [Pentazocine] Shortness Of Breath     Contrast Dye      Eggs [Chicken-Derived Products (Egg)]      Tape [Adhesive Tape]      Codeine Rash     Penicillins Rash            Medications:     Prescriptions Prior to Admission   Medication Sig Dispense Refill Last Dose     BUMETANIDE PO Take 0.5 mg by mouth every evening    Past Week at Unknown time     RIFAXIMIN PO Take 550 mg by mouth 2 times daily   3/28/2017 at 0800     lactulose (CHRONULAC) 10 GM/15ML solution Take 20 g by mouth 3 times daily    3/28/2017 at Unknown time     insulin glargine (LANTUS) 100 UNIT/ML injection Inject 32 Units Subcutaneous 2 times daily    3/28/2017 at 0800     SPIRONOLACTONE PO Take 100 mg by mouth daily   3/28/2017 at Unknown time     Bumetanide (BUMEX PO) Take 1 mg by mouth daily    Past Week at Unknown time     OMEPRAZOLE PO Take 20 mg by mouth every morning   3/27/2017 at Unknown time     insulin lispro (HUMALOG) 100 UNIT/ML VIAL Inject 10 Units Subcutaneous 3 times daily (before  meals) TID before meals,  or less 10 units; -150 12 units; -200 12 units; -250 13 units; -300 14 units; -350 15 units; -400 16 units   3/28/2017 at Unknown time     Sertraline HCl (ZOLOFT PO) Take 100 mg by mouth daily    3/28/2017 at Unknown time             Review of Systems:   A complete review of systems was performed and is negative except as noted in the HPI           Physical Exam:   /51 (BP Location: Left arm)  Temp 97.6  F (36.4  C) (Axillary)  Resp 18  Wt 89 kg (196 lb 3.4 oz)  SpO2 98%  BMI 27.37 kg/m2  Wt:   Wt Readings from Last 2 Encounters:   03/29/17 89 kg (196 lb 3.4 oz)   02/10/17 116.8 kg (257 lb 9.6 oz)      Constitutional: tired, confused, awakes briefly but not consistently following commands  Eyes: Sclera anicteric  Ears/nose/mouth/throat: Normal oropharynx without ulcers or exudate, mucus membranes slightly dry, hearing intact  Neck: supple, thyroid normal size, no cervical LAD  CV: normal rate, regular rhythm, no murmur/rub/gallop  Respiratory: clear bilaterally  Abd: soft, nondistended, +bs, no hepatosplenomegaly appreciated, appears nontender, no rebound or guarding  EXT: trace bilateral LE edema  Skin: warm, perfused, no jaundice  Neuro: tired, confused, oriented to self not to place/year/month         Data:   Labs and imaging below were independently reviewed and interpreted    BMP  Recent Labs  Lab 03/29/17  0720 03/28/17  1217   * 145*   POTASSIUM 3.7 4.0   CHLORIDE 109 107   ANY 8.6 8.4*   CO2 28 29   BUN 35* 34*   CR 1.91* 1.88*   * 122*     CBC  Recent Labs  Lab 03/29/17  0720 03/28/17  1217   WBC 8.1 5.3   RBC 3.68* 3.62*   HGB 10.2* 10.1*   HCT 32.5* 31.9*   MCV 88 88   MCH 27.7 27.9   MCHC 31.4* 31.7   RDW 16.2* 16.1*    158     INR  Recent Labs  Lab 03/29/17  0720 03/28/17  1217   INR 1.25* 1.21*     LFTs  Recent Labs  Lab 03/29/17  0720 03/28/17  1217   ALKPHOS 97 112   AST 22 19   ALT 14 15   BILITOTAL  1.4* 0.9   PROTTOTAL 6.1* 6.0*   ALBUMIN 2.5* 2.5*      PANCNo lab results found in last 7 days.    Imaging:  Abdominal ultrasound pending    HEPATOLOGY/GASTROENTEROLOGY ATTENDING NOTE    I saw, examined and discussed the patient with the resident. I participated in the decision making and agree with the above note. Crystal Mckeon MD

## 2017-03-29 NOTE — PROGRESS NOTES
"ADDENDUM 1600: Spoke to pt's son Declan. Per Declan, pt has said in the past (most recently ~2 wks ago) that she is tired of living this way and wants to die. Pt's son stated he doesn't agree with pt's statements, but does not want his mother to suffer and will support any decision pt makes. Pt's son understands that pt is not able to have full conversations about goals of care at this time. Additionally, pt's son discussed that he is aware that there is no POA or HCD paperwork, and that if he were to ask to have pt transitioned to comfort cares based on pt's previous discussions, pt's dtr Savita would \"fight it\". Pt's son's plan is to have pt return to [facility], hope that pt mentally clears and can sign POA paperwork and make a decision about hospice.     Social Work: Assessment with Discharge Plan    Patient Name:  Ursula Crockett  :  1950  Age:  66 year old  MRN:  [id number]  Risk/Complexity Score: 5    Completed assessment with:  Pt's daughter Savita and left  for pt's son Vahe.    Presenting Information   Reason for Referral:  Discharge plan  Date of Intake:  2017  Referral Source:  Physician  Decision Maker:  Pt is currently confused and lethargic; unable to make own decisions. Pt's spouse has cognitive deficits and unable to make decisions. Without a HCD or POA, pt's next of kin/decision makers would be son Vahe and dtr Savita.   Health Care Directive:  Pt does not have a HCD or POA paperwork. POLST is on file. Pt not able at this time to complete a HCD.  Living Situation: Pt has been at [facility] since 16. Prior to TCU stay, pt was living with a female roommate in [place].   Previous Functional Status:  Independent  Cultural/Language/Spiritual Considerations:  English is primary language. Pt identifies as Buddhist.  Adjustment to Illness:  Unable to assess.    Physical Health  Reason for Admission:    1. Hepatic encephalopathy (H)    2. " Altered mental status, unspecified altered mental status type      Services Needed/Recommended:  TCU    Mental Health/Chemical Dependency  Diagnosis:  none  Support/Services in Place:  n/a  Services Needed/Recommended:  n/a    Support System  Significant relationship at present time:  , spouse is Angel Bentley. Angel is a long term care resident at [facility] since 2014.  Family of origin is available for support:  Spoke to dtr Savita and left VM w/son Vahe. Per Savita, Savita has not spoken to pt for >2 months. Savita states Vahe placed pt and pt's spouse at [facility] because Vahe works there (Vahe has since found a different job) and feels that Vahe has kept pt from Savita. Per [facility] SW, son Vahe is primary involved family member, visits or calls daily, and usually makes all the decisions. However, there is no HCD or POA paperwork.    Son- Vahe Velez (P: [phone])  Daughter- Savita David (P: [phone])  Other support available:  none  Gaps in support system:  none  Patient is caregiver to:  None     Provider Information   Primary Care Physician:  Raudel Nicholson   [phone]   Clinic:  [facility] [id number] [address] [facility]      :  none    Financial   Income Source:  Unable to assess  Financial Concerns:  Unable to assess  Insurance:    Payor/Plan Subscriber Name Rel Member # Group #   MEDICARE - MEDICARE DIANA BENTLEY S  [id number]       ATTN CLAIMS, [address]   [employer] - [facility]* DIANA BENTLEY S  [id number]      [address]       Discharge Plan   Patient and family discharge goal:  Family okay with return to [facility] TCU.  Provided education on discharge plan:  YES  Patient agreeable to discharge plan:  Pt not able to participate in full conversation today.  Will NH provide Skilled rehabilitation or complex medical:  YES  General information regarding anticipated insurance coverage and  possible out of pocket cost was discussed. Patient and patient's family are aware patient may incur the cost of transportation to the facility, pending insurance payment: YES  Barriers to discharge:  Medical clearance    Discharge Recommendations   Anticipated Disposition:  Likely return to [facility] (P: [phone], F: [phone]). PT/OT consults pending. SLP to complete swallow eval tomorrow if pt able to participate.  Transportation Needs:  Medical- wc vs stretcher  Name of Transportation Company and Phone:  [employer] (Ph: [phone])    MAHOGANY Reese, LICSW  6B Intermediate Care Unit  Phone: [phone]  Pager: [phone]

## 2017-03-29 NOTE — PHARMACY-ADMISSION MEDICATION HISTORY
Admission medication history interview status for the 3/28/2017 admission is complete. See Epic admission navigator for allergy information, pharmacy, prior to admission medications and immunization status.     Medication history interview sources:  MAR from [facility]    Changes made to PTA medication list (reason)  Added: rifaximin  Deleted:   -ciprofloxacin, levofloxacin, methylprednisolone tabs, duplicate spironolactone entry  Changed:   -bumetanide to 1 mg qAM and 0.5 mg qPM  -insulin glargine to 32 units SQ BID  -lactulose to 20 g PO TID    Additional medication history information (including reliability of information, actions taken by pharmacist):  -MAR records indicate medication compliance, though this is disputed by the Pt's presentation w/ AMS, as noted in the H&P  -Pt does not have any influenza vaccinations on record, and would therefore be due for one this season.      Prior to Admission medications    Medication Sig Last Dose Taking? Auth Provider   BUMETANIDE PO Take 0.5 mg by mouth every evening  Past Week at Unknown time Yes Unknown, Entered By History   RIFAXIMIN PO Take 550 mg by mouth 2 times daily 3/28/2017 at 0800 Yes Unknown, Entered By History   lactulose (CHRONULAC) 10 GM/15ML solution Take 20 g by mouth 3 times daily  3/28/2017 at Unknown time Yes    insulin glargine (LANTUS) 100 UNIT/ML injection Inject 32 Units Subcutaneous 2 times daily  3/28/2017 at 0800 Yes Reported, Patient   SPIRONOLACTONE PO Take 100 mg by mouth daily 3/28/2017 at Unknown time Yes Reported, Patient   Bumetanide (BUMEX PO) Take 1 mg by mouth daily  Past Week at Unknown time Yes Reported, Patient   OMEPRAZOLE PO Take 20 mg by mouth every morning 3/27/2017 at Unknown time Yes Reported, Patient   insulin lispro (HUMALOG) 100 UNIT/ML VIAL Inject 10 Units Subcutaneous 3 times daily (before meals) TID before meals,  or less 10 units; -150 12 units; -200 12 units; -250 13 units;  -300 14 units; -350 15 units; -400 16 units 3/28/2017 at Unknown time Yes Reported, Patient   Sertraline HCl (ZOLOFT PO) Take 100 mg by mouth daily  3/28/2017 at Unknown time Yes Reported, Patient         Medication history completed by: Ray Bales, PharmD

## 2017-03-29 NOTE — PROGRESS NOTES
Admission          3/28/2017 12:20 PM  -----------------------------------------------------------  Reason for admission: Altered mental. Obtunded.   Primary team notified of pt arrival.  Admitted from: ER  Via: stretcher  Accompanied by: Bandar MACIAS  Belongings: Clothing placed in closet.   Admission Profile: unable to complete, pt is not able to answer questions at this time.   Access: PIV  Telemetry:Placed on pt  Ht./Wt.: complete  Pt status: Pt is obtunded. Opening eyes to stimulation. On RA.    Skin assessment performed with 2nd RN Ky.     Temp:  [97.5  F (36.4  C)-98.7  F (37.1  C)] 98.2  F (36.8  C)  Heart Rate:  [70-90] 72  Resp:  [11-20] 14  BP: (128-145)/(52-64) 132/52  SpO2:  [93 %-97 %] 96 %

## 2017-03-30 NOTE — PROVIDER NOTIFICATION
Time of notification: 12:20 PM  MD notified: Rosa AVILA, Gold 2  Patient status:   Temp:  [97.3  F (36.3  C)-98.5  F (36.9  C)] 98  F (36.7  C)  Heart Rate:  [69-75] 74  Resp:  [16-18] 16  BP: (124-148)/(48-56) 129/55  SpO2:  [97 %-99 %] 98 %  Orders received: Will reorder her home insulin

## 2017-03-30 NOTE — PLAN OF CARE
Problem: Goal Outcome Summary  Goal: Goal Outcome Summary  Outcome: Improving  Transfer  Transferred to: 5B room 234-2  Via: wheelchair  Reason for transfer:Pt no longer appropriate for 6B- improved patient condition  Family: Aware of transfer  Belongings: Packed and sent with pt  Chart: Delivered with pt to next unit  Medications: Meds received from old unit with pt  Pt status: stable

## 2017-03-30 NOTE — PROGRESS NOTES
03/30/17 1647   Quick Adds   Type of Visit Initial Occupational Therapy Evaluation   Living Environment   Living Arrangements house  (1 level)   Home Accessibility stairs to enter home   Number of Stairs to Enter Home 3   Number of Stairs Within Home 0   Transportation Available family or friend will provide   Living Environment Comment Pt was admitted from TCU however she does own her own home and eventually the goal is to return there independently - pt did have a roommate at her house however that individual has moved and she would be living there alone   Self-Care   Dominant Hand right   Usual Activity Tolerance moderate   Current Activity Tolerance fair   Regular Exercise yes   Activity/Exercise Type (OT/PT at [facility])   Exercise Amount/Frequency daily   Equipment Currently Used at Home walker, rolling;grab bar  (built in shower chair)   Activity/Exercise/Self-Care Comment Pt reports prior to recent hospitalizations and TCU stay she was using FWW in her home for mobility; pt reports at TCU she was using FWW - staff assisting with all mobility and transfers   Functional Level Prior   Ambulation 3-->assistive equipment and person   Transferring 3-->assistive equipment and person   Toileting 3-->assistive equipment and person   Bathing 3-->assistive equipment and person   Dressing 3-->assistive equipment and person   Eating 0-->independent   Communication 0-->understands/communicates without difficulty   Swallowing 0-->swallows foods/liquids without difficulty   Cognition 0 - no cognition issues reported   Fall history within last six months yes   Number of times patient has fallen within last six months 1   Which of the above functional risks had a recent onset or change? ambulation;transferring;toileting;bathing;dressing;cognition;fall history   Prior Functional Level Comment Prior to recent hospitalizations and TCU stay pt was living at home and (I) with all ADL/IADLs; she no longer drives (son assists with rides)  "and she does not work outside the home; At TCU pt was receiving staff assist with all transfers and functional mobility   General Information   Onset of Illness/Injury or Date of Surgery - Date 03/28/17   Referring Physician Xiomara Zacarias PA-C   Patient/Family Goals Statement to return to TCU and then eventually home   Additional Occupational Profile Info/Pertinent History of Current Problem Pt is a 66 year old female with a past medical history of DM2, renal cancer s/p left nephrectomy, CKD stage 3 (of single kidney), HEADLEY cirrhosis c/b HE, EV s/p banding in 10/2016 and refractory ascites s/p TIPS 11/2016 who is admitted for AMS, found to have HE   Precautions/Limitations fall precautions   Weight-Bearing Status - LUE full weight-bearing   Weight-Bearing Status - RUE full weight-bearing   Weight-Bearing Status - LLE full weight-bearing   Weight-Bearing Status - RLE full weight-bearing   Cognitive Status Examination   Orientation person;place  (day, month; stated year as \"2007\", \"Trump\" as president)   Level of Consciousness alert;confused   Able to Follow Commands success, 1-step commands   Personal Safety (Cognitive) moderate impairment   Memory impaired   Attention Sustained attention impaired   Cognitive Comment Pt with HE - to be further assessed as pt clears   Visual Perception   Visual Perception Wears glasses  (all the time)   Visual Perception Comments Pt reports no new concerns with her vision though she complains she is having trouble seeing here as she does not have her glasses at the hospital   Sensory Examination   Sensory Quick Adds No deficits were identified   Pain Assessment   Patient Currently in Pain No   Range of Motion (ROM)   ROM Comment BUEs WFL   Strength   Strength Comments RUE 4+5, L shoulder 3+/5 (pt reports no pain but \"overworked\" with therapies at TCU); remainder of LUE WFL; weak B    Mobility   Bed Mobility Bed mobility skill: Supine to sit;Bed mobility skill: Sit to " supine;Bed mobility skill: Scooting/Bridging;Bed mobility skill: Rolling/Turning   Bed Mobility Skill: Rolling/Turning   Level of [place] - Bed Mobility Skill Rolling Turning minimum assist (75% patients effort)   Physical Assist/Nonphysical Assist verbal cues;1 person assist   Bed Mobility Skill: Scooting/Bridging   Level of [place]: Scooting/Bridging minimum assist (75% patients effort)   Physical Assist/Nonphysical Assist: Scooting/Bridging verbal cues;1 person assist   Bed Mobility Skill: Sit to Supine   Level of [place]: Sit/Supine contact guard   Physical Assist/Nonphysical Assist: Sit/Supine verbal cues;1 person assist   Bed Mobility Skill: Supine to Sit   Level of [place]: Supine/Sit minimum assist (75% patients effort)   Physical Assist/Nonphysical Assist: Supine/Sit verbal cues;1 person assist   Transfer Skill: Sit to Stand   Level of [place]: Sit/Stand moderate assist (50% patients effort)   Physical Assist/Nonphysical Assist: Sit/Stand verbal cues;1 person assist   Lower Body Dressing   Level of [place]: Dress Lower Body moderate assist (50% patients effort)   Physical Assist/Nonphysical Assist: Dress Lower Body verbal cues;1 person assist   Activities of Daily Living Analysis   Impairments Contributing to Impaired Activities of Daily Living balance impaired;cognition impaired;strength decreased   General Therapy Interventions   Planned Therapy Interventions ADL retraining;IADL retraining;bed mobility training;cognition;ROM;strengthening;stretching;transfer training;home program guidelines;progressive activity/exercise   Clinical Impression   Criteria for Skilled Therapeutic Interventions Met yes, treatment indicated   OT Diagnosis decreased ADL/IADL (I)   Influenced by the following impairments weakness, balance, cognition   Assessment of Occupational Performance 5 or more Performance Deficits   Identified Performance Deficits toileting, grooming, bathing, dressing,  "cooking, household management, med mgmt, financial mgmt   Clinical Decision Making (Complexity) Moderate complexity   Therapy Frequency other (see comments)  (6x/week)   Predicted Duration of Therapy Intervention (days/wks) 7 days   Anticipated Discharge Disposition Transitional Care Facility   Risks and Benefits of Treatment have been explained. Yes   Patient, Family & other staff in agreement with plan of care Yes   [facility] TM \"6 Clicks\"   2016, Trustees of [facility], under license to [employer].  All rights reserved.   6 Clicks Short Forms Daily Activity Inpatient Short Form   [facility] AM-PAC  \"6 Clicks\" Daily Activity Inpatient Short Form   1. Putting on and taking off regular lower body clothing? 2 - A Lot   2. Bathing (including washing, rinsing, drying)? 2 - A Lot   3. Toileting, which includes using toilet, bedpan or urinal? 2 - A Lot   4. Putting on and taking off regular upper body clothing? 2 - A Lot   5. Taking care of personal grooming such as brushing teeth? 3 - A Little   6. Eating meals? 3 - A Little   Daily Activity Raw Score (Score out of 24.Lower scores equate to lower levels of function) 14   Total Evaluation Time   Total Evaluation Time (Minutes) 10     "

## 2017-03-30 NOTE — PLAN OF CARE
Problem: Goal Outcome Summary  Goal: Goal Outcome Summary  Outcome: No Change  /54 (BP Location: Left arm)  Temp 98.5  F (36.9  C) (Oral)  Resp 16  Wt 89.5 kg (197 lb 5 oz)  SpO2 99%  BMI 27.52 kg/m2     Neuro: Disoriented to place and time. Arouses easily.  Cardiac:Normal sinus rhythm. Heart rate sustains in 70's.  Resp: Maintains sats >98% on RA  GI/: Adequate output before bridges pulled. Due to void by 0900. BM X4. Lactulose given per protocol  Diet/Appetite: Regular. Good appetite.   Access: L PIV  Activity: PT and OT evaluation ordered for today.  Pain: denies pain at this time.           Will continue with plan of care and notify MD of any changes.

## 2017-03-30 NOTE — PLAN OF CARE
"Problem: Goal Outcome Summary  Goal: Goal Outcome Summary     6B: OT eval completed and tx initiated. Pt oriented to date/month, location, and president (stated year as \"2007\"); pt needs repeated cuing to follow instruction and demonstrates little carryover. Pt able to transfer up to EOB with min A; pt completed transfer to/from [facility] with mod A and VCs; min A for clothing management/changing brief.     Recommend discharge back to TCU to increase pt's strength and (I) with functional transfers and ADLs.      "

## 2017-03-30 NOTE — PLAN OF CARE
Problem: Goal Outcome Summary  Goal: Goal Outcome Summary  PT 6B: Evaluation completed and treatment initiated. Pt completes bed mobility with Willa. Sit<>stand transfers with Willa of 2 and fww. Ambulates around EOB (~10 ft) with fww and CGA of 2. Significantly decreased gait speed, but no overt LOB. Tolerates activity well with rest breaks. VSS on RA.      Recommend discharge back to TCU once medically appropriate to progress IND with functional mobility, balance, and strengthening.

## 2017-03-30 NOTE — PROGRESS NOTES
[facility] - Internal Medicine Daily Note   Date of Service: 3/30/2017  Patient: Ursula Crockett  MRN: [id number]  Admission Date: 3/28/2017  Hospital Day # 2    Assessment & Plan: Ursula Crockett is a 66 year old female with a past medical history of DM2, renal cancer s/p left nephrectomy, CKD stage 3 (of single kidney), HEADLEY cirrhosis c/b HE, EV s/p banding in 10/2016 and refractory ascites s/p TIPS 11/2016 who is admitted for AMS, found to have HE.    Hepatic encephalopathy. S/p TIPS in 11/2016. Recent admission to [facility] 03/20-03/24 for HE (on lactulose), 02/25-02/28 for HE 2/2 noncompliance w/ lactulose. PTA maintained on rifaximin 550 mg BID, lactulose 30 mg TID titrated to 3-5 stools/day at NH. NH documentation w/o any missed doses of lactulose- but discussed with NH staff and they note that she hasn't been stooling. Ammonia of 194 in ED. No drugs (narcs or benzos). Infx w/u w/ CXR noting moderate left sided pluaral effusion w/ overlying atelecatsis or consolidation (unchanged), diffuse hazy opacities consistent w/ pulm edema vs infection, UA w/o e/o infection, afebrile, WBC wnl, lactic acid wnl, no ascites on bedside US to r/o dx para. Hgb stable, no e/o GIB. Abd US performed 3/28 showed patent TIPS, increased shunt velocity wnl but still concerning for stenosis with impending TIPS failure. VSS. Most likely due to constipation on incorrect lactulose dosing. Mental status is improved today on lactulose protocol. Discussed with Dr. Haddad (IR) regarding TIPs, he recommends that if patient has another admission for HE, that IR should be consulted to narrow the TIPs (which would result in more ascites, but may help with HE).  - Continue lactulose 20g TID, titrate to have 3-4 BMs per day  - Continue rifaximin  - Follow up on BCx x2, NGTD  - Trend CBC, CMP qd  - Will need to instruct nursing home on goals BMs in hopes to prevent further admissions    HEADLEY cirrhosis. Follows w/ Dr. Mckeon. C/B EV  s/p banding x7 10/16, portal hypertensive gastropathy, ascites s/p TIPS, requires nora once/3 weeks w/ 2 L ascitic fluid off. On bumex 1 mg qam, 0.5 mg qpm, aldactone 100 mg qam, 50 mg qafternoon,lactulose and rifaximin as above PTA. Platelets stable, bili stable, INR stable. MELD Na 15 today. No ascites on bedside US 3/29 for para.   -Could use IV lasix PRN for hypervolemia  -Lactluose protocol as above  -No need for SBP ppx      Left sided pleural effusion. Present since 11/2016. ECHO 03/2017 at  w/ normal LV EF at 60% but grade II diastolic dysfunction, normal RV size and function, no valvular abnormalities. Refused thora at [facility] recently. Most likely 2/2 ascites and HEADLEY cirrhosis. Not requiring O2.   - Monitor O2 sats  - Consider thora pending symptoms  - No IVF or albumin    DM2. On Lantus 32 units BID, humalog 5-12 unit custom subq TID AC. Hgb A1C 6.9 in 04/2016. Pt was NPO d/t encephalopathy on admission 3/28. BGs now increased to 400-500s currently in setting of encephalopathy clearing and starting po intake without lantus being started soon enough  - Start insulin gtt, plan to transition to home regimen tomorrow  - Hypoglycemia protocol    ARMANDO on CKD stage 3. Resolved. Baseline creat ~1.5 since 10/2016, admitted w/ creat of 1.88, elevated BUN to 34. Cr improved to 1.69 (1.91) today. ARMANDO likely pre-renal from not tolerating po intake d/t encephalopathy. UA not c/w infection. Of note patient has single kidney as she is s/p left nephrectomy in 2011.   - Cont to hold diuretics, plan to resume tomorrow  - Will hold on albumin or fluids given L sided pleural effusion as above  - Avoid nephrotoxics      Normocytic anemia. Hgb of 10.1 on admission which is baseline. Normal MCV. Iron studies in 04/2016 w/ low iron sat, normal IBC, normal iron. Likely due to liver disease, CKD. Hgb stable 9.9.     Positive urine culture. UCx from 3/28 with enterococcus, final pending. Patient denies urinary symptoms.  Afebrile. Will cont to monitor for final culture, hold on treating at this time.       CODE: DNR/DNI per POLST dated 12/2016  FEN: mod CHO diet   DVT: Mechanical, SCDs  LINES: PIV  Disposition/Admission Status: inpatient, plan to discharge back to TCU tomorrow if medically stable       Patient's care was discussed with bedside RN, patient, and MD during Care Team Rounds.      Rosa Dunne PA-C  [phone]  Team: Medicine [facility] 2  Page Cross Cover after 5 pm on pager [phone].    ___________________________________________________________________    Subjective & Interval History:     Janis was sitting up in bed and looks more alert today. She is able to tell me the date, the president, and the name of the hospital. She is happy to be eating PO today. She denies pain, SOB, chest pain, fevers/chills, and abdominal pain. She says she feels tired today as she did not sleep well last night.     Last 24 hour care team notes reviewed.   ROS: 4 point ROS (including Respiratory, CV, GI and ) was performed and negative unless otherwise noted in HPI.     Medications: Reviewed in EPIC.    Physical Exam:    Blood pressure 129/55, temperature 98  F (36.7  C), temperature source Oral, resp. rate 16, weight 89.5 kg (197 lb 5 oz), SpO2 98 %.    GENERAL: Sitting up in bed. Alert and orientated x 3. NAD.  HEENT: Anicteric sclera. Mucous membranes moist.  NECK: Supple.  CV: RRR. No murmurs, clicks, or rubs.  RESPIRATORY: CTAB. No wheezes or crackles.  GI: Abdomen soft and non-tender. No palpable masses. Normoactive bowel sounds in all quadrants.  NEURO: No focal deficits. No asterixis.    MUSCULOSKELETAL/EXTREMITIES: No peripheral edema.   PSYCH: No jaundice or rashes.

## 2017-03-30 NOTE — PLAN OF CARE
Problem: Goal Outcome Summary  Goal: Goal Outcome Summary  SLP: Clinical swallow evaluation completed per MD orders. Pt demonstrates a functional oropharyngeal swallow as characterized by no overt clinical s/sx of aspiration/penetration on trials of thin liquids and solid textures. Voice quality characterized by hoarseness, however no change noted throughout presentation of PO. Adequate lingual and labial strength and ROM. Recommend pt continue a regular consistency diet with thin liquids. Pt should be seated upright for all PO intake. Encourage small bites/sips, slow rate, and alternating consistencies. No further SLP services necessary at this time. Please reconsult with any changes in swallowing function.

## 2017-03-30 NOTE — PROGRESS NOTES
03/30/17 0800   General Information   Onset Date 03/28/17   Start of Care Date 03/30/17   Referring Physician Xiomara Zacarias PA-C   Patient Profile Review/OT: Additional Occupational Profile Info See Profile for full history and prior level of function   Patient/Family Goals Statement Pt wishes to continue on a regular diet   Swallowing Evaluation Bedside swallow evaluation   Behaviorial Observations WFL (within functional limits)   Mode of current nutrition Oral diet   Type of oral diet Regular;Thin liquid   Respiratory Status Room air   Comments Orders received for clinical swallow study. Pt is a 66 year old female with a past medical history of DM2, renal cancer s/p left nephrectomy, CKD stage 3 (of single kidney), HEADLEY cirrhosis c/b HE, EV s/p banding in 10/2016 and refractory ascites s/p TIPS 11/2016 who is admitted for AMS, found to have hepatic encephalopathy. Pt is currently tolerating a regular diet with thin liquids per MD.   Clinical Swallow Evaluation   Oral Musculature generally intact   Structural Abnormalities none present   Dentition upper and lower dentures   Mucosal Quality good   Mandibular Strength and Mobility intact   Oral Labial Strength and Mobility WFL   Lingual Strength and Mobility WFL   Velar Elevation intact   Buccal Strength and Mobility intact   Laryngeal Function Throat clear;Swallow;Voicing initiated   Additional Documentation Yes   Clinical Swallow Eval: Thin Liquid Texture Trial   Mode of Presentation, Thin Liquids straw;self-fed   Volume of Liquid or Food Presented Straw sips x4   Oral Phase of Swallow WFL   Pharyngeal Phase of Swallow intact   Diagnostic Statement Pt demonstrates no overt clinical s/sx of aspiration/penetration for thin liquid trials.   Clinical Swallow Eval: Solid Food Texture Trial   Mode of Presentation, Solid self-fed   Volume of Solid Food Presented 1 inch bites of english muffin x3   Oral Phase, Solid WFL   Pharyngeal Phase, Solid intact   Diagnostic  Statement Pt demonstrates no overt clinical s/sx of aspiration/penetration for solid texture trials.   VFSS Evaluation   VFSS Additional Documentation No   FEES Evaluation   Additional Documentation No   Swallow Compensations   Swallow Compensations No compensations were used   Results No compensations were used   Esophageal Phase of Swallow   Patient reports or presents with symptoms of esophageal dysphagia No   Swallow Eval: Clinical Impressions   Skilled Criteria for Therapy Intervention No problems identified which require skilled intervention   Functional Assessment Scale (FAS) 7   Treatment Diagnosis Functional oropharyngeal swallow   Diet texture recommendations Regular diet;Thin liquids   Recommended Feeding/Eating Techniques alternate between small bites and sips of food/liquid;small sips/bites   Risks and Benefits of Treatment have been explained. Yes   Patient, family and/or staff in agreement with Plan of Care Yes   Clinical Impression Comments Clinical swallow evaluation completed per MD orders. Pt demonstrates a functional oropharyngeal swallow as characterized by no overt clinical s/sx of aspiration/penetration on trials of thin liquids and solid textures. Voice quality characterized by hoarseness, however no change noted throughout presentation of PO. Adequate lingual and labial strength and ROM. Recommend pt continue a regular consistency diet with thin liquids. Pt should be seated upright for all PO intake. Encourage small bites/sips, slow rate, and alternating consistencies. No further SLP services necessary at this time. Please reconsult with any changes in swallowing function.    Total Evaluation Time   Total Evaluation Time (Minutes) 15

## 2017-03-30 NOTE — PROGRESS NOTES
03/30/17 1246   Quick Adds   Type of Visit Initial PT Evaluation       Present no   Language english   Living Environment   Lives With facility resident   Living Environment Comment Pt admitted from [facility]. Has been there since 12/8/2016.    Self-Care   Usual Activity Tolerance moderate   Current Activity Tolerance fair   Regular Exercise yes   Activity/Exercise Type other (see comments)  (PT/OT)   Exercise Amount/Frequency 5-7 times/wk   Activity/Exercise/Self-Care Comment Pt reports using a fww at [facility] for ambulation and mobility. Was walking up to ~180 ft with fww and Ax1.   Functional Level Prior   Ambulation 3-->assistive equipment and person   Transferring 3-->assistive equipment and person   Fall history within last six months yes   Number of times patient has fallen within last six months 1  (pt reports fall at TCU - but not able to clarify what happen)   Which of the above functional risks had a recent onset or change? ambulation;transferring;fall history   Prior Functional Level Comment Most recent level of function was Ax1 with fww at TCU   General Information   Onset of Illness/Injury or Date of Surgery - Date 03/28/17   Referring Physician Xiomara Zacarias PA-C   Patient/Family Goals Statement None stated   Pertinent History of Current Problem (include personal factors and/or comorbidities that impact the POC) 66 year old female with a past medical history of DM2, renal cancer s/p left nephrectomy, CKD stage 3 (of single kidney), HEADLEY cirrhosis c/b HE, EV s/p banding in 10/2016 and refractory ascites s/p TIPS 11/2016 who is admitted for AMS, found to have HE   Precautions/Limitations fall precautions   General Observations Pt supine in bed upon arrival. Agreeable to therapy session.    General Info Comments Activity Orders: Up with assist.    Cognitive Status Examination   Orientation orientation to person, place and time   Level of Consciousness alert   Follows Commands and  Answers Questions 100% of the time   Personal Safety and Judgment impulsive   Cognitive Comment Pt appropriately responds to questions and follows commands. Occasional illogical statements, but re-orients quickly.    Pain Assessment   Patient Currently in Pain No   Posture    Posture Forward head position;Protracted shoulders   Range of Motion (ROM)   ROM Comment B LE WFL   Strength   Strength Comments Demonstrates at least 3/5 B LE strength with functional mobility and transfers   Bed Mobility   Bed Mobility Comments Supine>sitting EOB with Willa and HOB elevated. Use of bed railing   Transfer Skills   Transfer Comments Sit<>stand with Willa of 2 and fww   Gait   Gait Comments Ambulates with fww and CGA of 2. Slow shuffled steps. Significantly decreased gait speed.   Balance   Balance Comments Seated EOB: good. Standing: good/fair with use of fww - CGA for safety   Sensory Examination   Sensory Perception Comments Denies any numbness or tingling in B LE   General Therapy Interventions   Planned Therapy Interventions balance training;bed mobility training;gait training;neuromuscular re-education;ROM;strengthening;stretching;transfer training;home program guidelines;progressive activity/exercise   Clinical Impression   Criteria for Skilled Therapeutic Intervention yes, treatment indicated   PT Diagnosis Decreased functional mobility   Influenced by the following impairments Decreased strength and activity tolerance, impaired balance   Functional limitations due to impairments Impaired ability to functionally navigate in home or community   Clinical Presentation Evolving/Changing   Clinical Presentation Rationale PMH, medical course   Clinical Decision Making (Complexity) Moderate complexity   Therapy Frequency` 5 times/week   Predicted Duration of Therapy Intervention (days/wks) 4/5/2017   Anticipated Equipment Needs at Discharge (TBD)   Anticipated Discharge Disposition Transitional Care Facility   Risk & Benefits of  therapy have been explained Yes   Patient, Family & other staff in agreement with plan of care Yes   Total Evaluation Time   Total Evaluation Time (Minutes) 6

## 2017-03-30 NOTE — PROGRESS NOTES
HEPATOLOGY PROGRESS NOTE  Ursula Crockett [id number]   03/30/2017    SUBJECTIVE:  No acute overnight events. Mental status is clearing, was able to eat dinner last night and breakfast this morning. Denies abdominal pain, fevers, chills, and dysuria. Is interested in if her TIPS is still working. Feels back to baseline.    A complete 10 point review of systems was obtained and was negative unless noted.    OBJECTIVE:  Constitutional: NAD, awake, alert, less confused than previous day  VS:  /54 (BP Location: Left arm)  Temp 98.5  F (36.9  C) (Oral)  Resp 16  Wt 89.5 kg (197 lb 5 oz)  SpO2 99%  BMI 27.52 kg/m2    Eyes: No scleral icterus.   Ears/nose/mouth/throat: Oropharynx normal. MMM.  Resp: CTA bilaterally superiorly and anteriorly.  CV: RRR., no murmur/rub/gallop. Peripheral pulses present.  Abdominal: Soft, NTND, bowel sounds present.  /Rectal: Not examined.   EXT: Moving all extremities. Trace edema in BLE.  Skin: No jaundice.  Neurological: oriented to place, year, and month. No asterixis.  Psych: Normal affect and mood.      REVIEW OF LABORATORY, PATHOLOGY AND IMAGING RESULTS:  BMP  Recent Labs  Lab 03/30/17  0549 03/29/17  0720 03/28/17  1217    146* 145*   POTASSIUM 4.1 3.7 4.0   CHLORIDE 104 109 107   ANY 8.5 8.6 8.4*   CO2 27 28 29   BUN 37* 35* 34*   CR 1.69* 1.91* 1.88*   * 121* 122*     CBC  Recent Labs  Lab 03/30/17  0549 03/29/17  0720 03/28/17  1217   WBC 8.4 8.1 5.3   RBC 3.52* 3.68* 3.62*   HGB 9.9* 10.2* 10.1*   HCT 31.3* 32.5* 31.9*   MCV 89 88 88   MCH 28.1 27.7 27.9   MCHC 31.6 31.4* 31.7   RDW 16.1* 16.2* 16.1*    157 158     INR  Recent Labs  Lab 03/30/17  0549 03/29/17  0720 03/28/17  1217   INR 1.24* 1.25* 1.21*     LFTs  Recent Labs  Lab 03/30/17  0549 03/29/17  0720 03/28/17  1217   ALKPHOS 93 97 112   AST 22 22 19   ALT 16 14 15   BILITOTAL 1.3 1.4* 0.9   PROTTOTAL 6.1* 6.1* 6.0*   ALBUMIN 2.5* 2.5* 2.5*      PANCNo lab results found in last 7  days.    MELD-Na score: 15 at 3/30/2017  5:49 AM  MELD score: 15 at 3/30/2017  5:49 AM  Calculated from:  Serum Creatinine: 1.69 mg/dL at 3/30/2017  5:49 AM  Serum Sodium: 139 mmol/L (Rounded to 137) at 3/30/2017  5:49 AM  Total Bilirubin: 1.3 mg/dL at 3/30/2017  5:49 AM  INR(ratio): 1.24 at 3/30/2017  5:49 AM  Age: 66 years    IMPRESSION/RECOMMENDATIONS: rUsula Crockett is a 66 year old female with a history of HEADLEY cirrhosis complicated by esophageal varices s/p banding 10/2016, ascites s/p TIPS 11/2016, and recent episodes of hepatic encephalopathy, renal cancer s/p left nephrectomy, and CKD III who was brought to the hospital from her TCU due to altered mental status concerning for hepatic encephalopathy. Mental status has greatly improved after receiving lactulose. Urine culture returned positive for >100k Enterococcus. Even though she is without clinical symptoms of a UTI and she has improved without antibiotics we would recommend treating based on sensitivities given her age and frailty. Not adequate amount of ascites for diagnostic paracentesis. Ultrasound showed functioning TIPS but increased velocity, this should likely be followed in the future to ensure TIPS does not fail. Continue with lactulose and rifaximin, agree with transitioning to scheduled lactulose.    -lactulose 20g TID  -rifaximin 550mg BID  -would recommend treating enterococcus on urine culture  -2 gram sodium diet given cirrhosis    Hepatology will continue to follow. Please page with questions.     Patient discussed with Dr. Santos.     Jeffery St MD  Internal Medicine PGY1

## 2017-03-30 NOTE — PLAN OF CARE
Problem: Goal Outcome Summary  Goal: Goal Outcome Summary  Outcome: Improving  /55 (BP Location: Left arm)  Temp 98  F (36.7  C) (Oral)  Resp 16  Wt 89.5 kg (197 lb 5 oz)  SpO2 98%  BMI 27.52 kg/m2     Neuro: A&Ox4. Forgetful, Gave scheduled lactulose and PRN x 1  Cardiac: SR. VSS.       Respiratory: Sating 97% on RA.  GI/: Pt was unable to void after bridges was removed, bladder scanned for 411 ml at 1445. Informed the on-coming RN that pt will need to be straight cath. BM X 3, loose brown  Diet/appetite: Tolerating Regular diet. Eating well. BS were in the 400s, notified MD. They reordered her home insulin. Writer gave 10 units of novolog and 15 units of lantus.They asked to recheck after 1 hour, the recheck 1 hours later was 514. Paged the team again, new orders placed for an insulin drip for this evening and into the night. MD stated it could be turned off in the AM and subq insulin restarted. Diet was changed to carb consistent.  Activity:  Assist of 1-2, up to chair, work with therapy  Pain: Denies pain.   Skin: Intact, no new deficits noted.     R: Continue with POC. Notify primary team with changes.

## 2017-03-31 NOTE — DISCHARGE SUMMARY
[facility]  Discharge Summary    Ursula Crockett MRN# [id number]   YOB: 1950 Age: 66 year old     Date of Admission:  3/28/2017  Date of Discharge:  3/31/2017  Admitting Physician:  Esa Manzo MD  Discharge Physician:  Kunal Owen MD (Contact: Rosa Dunne PA-C)  Discharging Service:  Internal Medicine     Primary Provider: Raudel Nicholson          Brief History of Illness:   Ursula Crockett is a 66 year old female with a past medical history of DM2, renal cancer s/p left nephrectomy, CKD stage 3 (of single kidney), 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 She was treated with lactulose protocol and cleared quickly. She was also found to have  UTI with enterococcus, only sensitive to vancomycin. She was discharged back to nursing facility on 3/31 with plan to continue strict lactulose with goal of 4-5 bowel movements per day and to continue IV vancomycin x 6 more days to complete a 7d course.         Primary care provider to do:   - Ensure patient is getting adequate lactulose dosing at nursing facility  - Ensure patient has completed course of antibiotics for UTI  - Cont to monitor CMP and CBC as outpatient          Discharge Diagnosis:   Hepatic encephalopathy               Discharge Disposition:   Discharged to nursing home           Condition on Discharge:   Discharge condition: Stable   Code status on discharge: DNR / DNI           Procedures:   None this admission          Discharge Medications:     Current Discharge Medication List      START taking these medications    Details   vancomycin 1,250 mg Inject 1,250 mg into the vein every 24 hours for 6 days    Associated Diagnoses: Acute cystitis without hematuria         CONTINUE these medications which have CHANGED    Details   lactulose (CHRONULAC) 10 GM/15ML solution Take 30 mLs (20 g) by mouth 3 times  daily  Refills: 0    Comments: Hold third dose if patient has already had 4-5 bowel movements since 0600 that day.  Associated Diagnoses: Hepatic encephalopathy (H)         CONTINUE these medications which have NOT CHANGED    Details   !! BUMETANIDE PO Take 0.5 mg by mouth every evening       RIFAXIMIN PO Take 550 mg by mouth 2 times daily      insulin glargine (LANTUS) 100 UNIT/ML injection Inject 32 Units Subcutaneous 2 times daily       SPIRONOLACTONE PO Take 100 mg by mouth daily      !! Bumetanide (BUMEX PO) Take 1 mg by mouth daily       OMEPRAZOLE PO Take 20 mg by mouth every morning      insulin lispro (HUMALOG) 100 UNIT/ML VIAL Inject 10 Units Subcutaneous 3 times daily (before meals) TID before meals,  or less 10 units; -150 12 units; -200 12 units; -250 13 units; -300 14 units; -350 15 units; -400 16 units    Associated Diagnoses: Cirrhosis of liver with ascites, unspecified hepatic cirrhosis type (H)      Sertraline HCl (ZOLOFT PO) Take 100 mg by mouth daily     Associated Diagnoses: Cirrhosis of liver with ascites, unspecified hepatic cirrhosis type (H)       !! - Potential duplicate medications found. Please discuss with provider.                Consultations:   Consultation during this admission received from GI (hepatology)              Hospital Course:   Hepatic encephalopathy. S/p TIPS in 11/2016. Recent admission to [facility] 03/20-03/24 for HE (on lactulose), 02/25-02/28 for HE 2/2 noncompliance w/ lactulose. PTA maintained on rifaximin 550 mg BID, lactulose 30 mg TID titrated to 3-5 stools/day at NH. NH documentation w/o any missed doses of lactulose- but discussed with NH staff and they note that she hasn't been stooling. Ammonia of 194 in ED. No drugs (narcs or benzos). Infx w/u positive for UTI (as below), otherwise unremarkable w/ CXR noting moderate left sided pluaral effusion w/ overlying atelecatsis or consolidation (unchanged), diffuse hazy  opacities consistent w/ pulm edema vs infection, afebrile, WBC wnl, lactic acid wnl, no ascites on bedside US to r/o dx para. Hgb stable, no e/o GIB. Abd US performed 3/28 showed patent TIPS, increased shunt velocity wnl but still concerning for stenosis with impending TIPS failure. VSS. Most likely due to constipation on incorrect lactulose dosing. Mental status is improved with lactulose protocol and treating UTI as below. Discussed with Dr. Haddad (IR) regarding TIPs, he recommends that if patient has another admission for HE, that IR should be consulted to narrow the TIPs (which would result in more ascites, but may help with HE).  - Continue lactulose 20g TID, titrate to have 4 BMs per day. Clearly outlined this plan on discharge orders for NH to follow. If patient has not had 4BMs by 2000 daily, will need an extra dose of po lactulose.   - Continue rifaximin     HEADLEY cirrhosis. Follows w/ Dr. Mckeon. C/B EV s/p banding x7 10/16, portal hypertensive gastropathy, ascites s/p TIPS, requires nora once/3 weeks w/ 2 L ascitic fluid off. On bumex 1 mg qam, 0.5 mg qpm, aldactone 100 mg qam, 50 mg qafternoon,lactulose and rifaximin as above PTA. Platelets stable, bili stable, INR stable. MELD Na 15 today. No ascites on bedside US 3/29 for para. Continues on lactulose as outlined above. Home diuretics resumed on 3/31. No need for SBP ppx.     UTI. UCx from 3/28 with enterococcus, only sensitive to vancomycin. Although patient denies any urinary symptoms at this time, will opt to treat given frequent readmissions for HE. PICC placed 3/31. Will continue on vancomycin at nursing home to continue for total of 7d course. Kidney function and vancomycin levels to be monitor closely given CKD.       Left sided pleural effusion. Present since 11/2016. ECHO 03/2017 at  w/ normal LV EF at 60% but grade II diastolic dysfunction, normal RV size and function, no valvular abnormalities. Refused thora at [facility] recently.  Most likely 2/2 ascites and HEADLEY cirrhosis. Did not require O2 this admission. Continued on home diuretics on discharge.      DM2. On Lantus 32 units BID, humalog 5-12 unit custom subq TID AC. Hgb A1C 6.9 in 04/2016. Pt was NPO d/t encephalopathy on admission 3/28, and when she began eating her BGs elevated to 400-500. Required insulin gtt 3/30-3/31. Transitioned back to home regimen (listed above) on 3/31 with stable BGs following.     ARMANDO on CKD stage 3. Resolved. Baseline creat ~1.5 since 10/2016, admitted w/ creat of 1.88, elevated BUN to 34. Cr improved with treatment of UTI and encephalopathy, with holding of home diuretics. Cr improved to baseline of 1.5 on day of discharge. Tolerating po intake. Home diuretics resumed 3/31. UTI being treated as above. Avoid nephrotoxins. Vanco levels and BMP to be monitored closely on discharge.       Normocytic anemia. Hgb of 10.1 on admission which is baseline. Normal MCV. Iron studies in 04/2016 w/ low iron sat, normal IBC, normal iron. Likely due to liver disease, CKD. Hgb stable this admission.                   Final Day of Progress before Discharge:       Physical Exam:  Blood pressure 116/44, temperature 97.8  F (36.6  C), temperature source Oral, resp. rate 18, weight 85.7 kg (189 lb), SpO2 98 %.  GENERAL: Alert and oriented x 3. NAD. Sitting up in bed. Cooperative.   HEENT: Anicteric sclera. PERRL. MMM.   CV: RRR. S1, S2. No m/r/g.   RESPIRATORY: Effort normal on RA. Lungs CTAB with no wheezing, rales, rhonchi. Scant crackles in LLL.   GI: Abdomen soft and non distended with normoactive bowel sounds present in all quadrants. No tenderness, rebound, guarding.   MUSCULOSKELETAL: No joint swelling or tenderness. Moves all extremities.   NEUROLOGICAL: No focal deficits. Strength 5/5 bilaterally in upper and lower extremities. No asterixis.   EXTREMITIES: No peripheral edema. Intact bilateral pedal pulses.   SKIN: No jaundice. No rashes.        Data:  All laboratory  data reviewed             Significant Results:     Lab Results   Component Value Date    WBC 8.3 03/31/2017    HGB 9.6 (L) 03/31/2017    HCT 29.3 (L) 03/31/2017     (L) 03/31/2017     03/31/2017    POTASSIUM 4.0 03/31/2017    CHLORIDE 102 03/31/2017    CO2 25 03/31/2017    BUN 31 (H) 03/31/2017    CR 1.50 (H) 03/31/2017     (H) 03/31/2017    DD 10.9 (H) 11/19/2016    NTBNPI 744 01/06/2017    AST 20 03/31/2017    ALT 17 03/31/2017    ALKPHOS 99 03/31/2017    BILITOTAL 1.3 03/31/2017    EHSAN 194 (HH) 03/28/2017    INR 1.23 (H) 03/31/2017      No results found for this or any previous visit (from the past 48 hour(s)).             Pending Results:   Unresulted Labs Ordered in the Past 30 Days of this Admission     Date and Time Order Name Status Description    3/28/2017 1255 Blood culture Preliminary     3/28/2017 1255 Blood culture Preliminary                   Discharge Instructions and Follow-Up:     Discharge Procedure Orders  General info for SNF   Order Comments: Length of Stay Estimate: Long Term Care  Condition at Discharge: Improving  Level of care:skilled   Rehabilitation Potential: Fair  Admission H&P remains valid and up-to-date: Yes  Recent Chemotherapy: N/A  Use Nursing Home Standing Orders: Yes     Mantoux instructions   Order Comments: Give two-step Mantoux (PPD) Per Facility Policy Yes     Reason for your hospital stay   Order Comments: Ursula Crockett is a 66 year old female with a past medical history of DM2, renal cancer s/p left nephrectomy, CKD stage 3 (of single kidney), 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 She was treated with lactulose protocol and cleared quickly. She was also found to have  UTI with enterococcus, only sensitive to vancomycin. She was discharged back to nursing facility on 3/31 with plan to continue strict lactulose with goal of 4-5 bowel movements per day and to  continue IV vancomycin x 6 more days to complete a 7d course.     IV access   Order Comments: PICC.     Follow Up and recommended labs and tests   Order Comments: Needs to have vancomycin level checked 4/2 and dose adjusted per pharmacy if high  Needs to have BMP check on 4/2 to monitor creatinine while on vancomycin  Needs to have CBC, CMP checked on 4/3  Follow up with primary care provider in 1-2 weeks time for routine follow up     Additional Discharge Instructions   Order Comments: Please follow bowel plan:  Scheduled lactulose 20g three times daily - hold third dose if patient has already had 4 bowel movements since 0600 that day  If by 2000 each day, if patient has not had 4 bowel movements, will need an extra 20g of po lactulose     Activity - Up with nursing assistance   Order Specific Question Answer Comments   Is discharge order? Yes      DNR/DNI     Physical Therapy Adult Consult   Order Comments: Evaluate and treat as clinically indicated.    Reason:  Continue with therapies for deconditioning     Occupational Therapy Adult Consult   Order Comments: Evaluate and treat as clinically indicated.    Reason:  Continue with therapies for deconditioning     Advance Diet as Tolerated   Order Comments: Follow this diet upon discharge:      Moderate Consistent CHO Diet   Order Specific Question Answer Comments   Is discharge order? Yes               Rosa [facility]  [phone]  March 31, 2017        Time spent on patient: 45 minutes total including face to face and coordinating care time reviewing current illness, any medication changes, and the care plan for today.    Discharge plan was discussed with Dr Owen, patient, RN CC, and SW.

## 2017-03-31 NOTE — PLAN OF CARE
Problem: Goal Outcome Summary  Goal: Goal Outcome Summary  Received patient from  at 1700 via WC and accompanied by transport personnel. She is verbally responsive, forgetful with some signs of disorientation, appeared weak and tired. Able to pivot transfer with assist of 1-2, independent with bed mobility. With ongoing insulin drip at 5.5 u/hr at algorithm 1. New PIV inserted at L arm and insulin restarted after BG was taken, insulin drip protocol followed thereafter.  Ate 100% of supper and had good appetite. Patient on lactulose protocol and had 1 BM so far this shift.  P: Continue to monitor BG and follow Drip protocol. Monitor mental status.

## 2017-03-31 NOTE — PLAN OF CARE
Problem: Goal Outcome Summary  Goal: Goal Outcome Summary  Last BG this shift is 148. Insulin drip rate decreased to 2 U/hr on algorithm 2.

## 2017-03-31 NOTE — PLAN OF CARE
Problem: Goal Outcome Summary  Goal: Goal Outcome Summary  PT/5B: Patient able to increase ambulation distance to 70' feet today using FWW and CGA x 1 for stability. Still is needing SBA-CGA with all transfers and remains deconditioned and below baseline level of mobility. Patient will benefit from d/c back to TCU in order to progress safety and independence with mobility as well as strength and endurance. Patient was in agreement with this plan.     Physical Therapy Discharge Summary    Reason for therapy discharge:    Discharged to transitional care facility.    Progress towards therapy goal(s). See goals on Care Plan in [facility] electronic health record for goal details.  Goals partially met.  Barriers to achieving goals:   discharge from facility.    Therapy recommendation(s):    Continued therapy is recommended.  Rationale/Recommendations:  d/c to TCU in order to improve independenc with functional mobility. .

## 2017-03-31 NOTE — PROGRESS NOTES
Patient report called to shanthi nurse at [facility].  Patient had iv removed. vanco given. bumex and sprionalcton pateint did not want to take due to going back to faciliyt same with lactolose and shanthi was notified that patient will need dose when she gets there.  Packet given to .

## 2017-03-31 NOTE — PLAN OF CARE
Problem: Goal Outcome Summary  Goal: Goal Outcome Summary  OT/6B: Pt completed bed mobility supine to seated at EOB with min A. Completed g/h and ADL tasks with set up and intermittent cues for sequence and redirection.     Per plan established by the OT, the recommendation for dc location is  back to TCU to increase pt's strength and (I) with functional transfers and ADLs.

## 2017-03-31 NOTE — PLAN OF CARE
Problem: Goal Outcome Summary  Goal: Goal Outcome Summary  Outcome: No Change  Patient A&Ox4, but forgetful at times. AVSS on RA. No urine or stool overnight. Patient on insulin drip on Algorithm 2, which has varied from 1-3 u/hr, and BG has ranged from 108-165.      Continue to monitor BG, follow drip protocol, and monitor mental status.

## 2017-03-31 NOTE — PHARMACY-VANCOMYCIN DOSING SERVICE
Pharmacy Vancomycin Initial Note  Date of Service 2017  Patient's  1950  66 year old, female    Indication: Urinary Tract Infection    Current estimated CrCl = Estimated Creatinine Clearance: 44.7 mL/min (based on Cr of 1.5).    Creatinine for last 3 days  3/28/2017: 12:17 PM Creatinine 1.88 mg/dL  3/29/2017:  7:20 AM Creatinine 1.91 mg/dL  3/30/2017:  5:49 AM Creatinine 1.69 mg/dL  3/31/2017:  6:35 AM Creatinine 1.50 mg/dL    Recent Vancomycin Level(s) for last 3 days  No results found for requested labs within last 72 hours.      Vancomycin IV Administrations (past 72 hours)      No vancomycin orders with administrations in past 72 hours.                Nephrotoxins and other renal medications (Future)    Start     Dose/Rate Route Frequency Ordered Stop    17 0900  vancomycin (VANCOCIN) 1,250 mg in NaCl 0.9 % 250 mL intermittent infusion      1,250 mg Intravenous EVERY 24 HOURS 17 0839            Contrast Orders - past 72 hours     None                Plan:  1.  Start vancomycin  1250 mg (~15mg/kg) IV q24h.   2.  Goal Trough Level: 10-15 mg/L   3.  Pharmacy will check trough levels as appropriate in 1-3 Days.    4. Serum creatinine levels will be ordered a minimum of twice weekly.    5. [place] method utilized to dose vancomycin therapy: Method 2    Radha Florez, JeffersonD, BCPS

## 2017-04-01 NOTE — PLAN OF CARE
Problem: Goal Outcome Summary  Goal: Goal Outcome Summary  Occupational Therapy Discharge Summary     Reason for therapy discharge:    Discharged to transitional care facility.     Progress towards therapy goal(s). See goals on Care Plan in [facility] electronic health record for goal details.  Goals partially met.  Barriers to achieving goals:   discharge from facility.     Therapy recommendation(s):    Continued therapy is recommended.  Rationale/Recommendations:  to maximize independence with ADLs and functional mobility.

## 2017-04-03 NOTE — TELEPHONE ENCOUNTER
Was the patient contacted by phone and reminded of the upcoming visit? Confirmed with care facility     Was the patient instructed to bring a current list of all medications to the appointment or instructed to bring in all medication bottles? Yes     Was the patient instructed to arrive prior to the appointment time to have ordered labs drawn? Yes     Were the needed lab orders placed? Yes    Jyoti Reid CMA

## 2017-04-04 NOTE — PROGRESS NOTES
Patient has clinic visit today 4/4 so no post DC follow up call is needed for 3/31 DC date          Apr 04, 2017 8:50 AM CDT   (Arrive by 8:35 AM)   Return General Liver with Crystal Mckeon MD   [facility] Hepatology ([facility])

## 2017-05-01 NOTE — TELEPHONE ENCOUNTER
Was the patient contacted by phone and reminded of the upcoming visit? Confirmed appointment with [facility]    Was the patient instructed to bring a current list of all medications to the appointment or instructed to bring in all medication bottles? Yes     Was the patient instructed to arrive prior to the appointment time to have ordered labs drawn? Yes     Were the needed lab orders placed? Yes    Jyoti Reid CMA

## 2017-05-18 NOTE — TELEPHONE ENCOUNTER
Was the patient contacted by phone and reminded of the upcoming visit? Confirmed appointment with Cat[facility] at [facility]    Was the patient instructed to bring a current list of all medications to the appointment or instructed to bring in all medication bottles? Yes     Was the patient instructed to arrive prior to the appointment time to have ordered labs drawn? Yes     Were the needed lab orders placed? Yes    Jyoti Reid [facility]  5/18/2017 12:38 PM

## 2017-06-07 NOTE — TELEPHONE ENCOUNTER
----- Message from Trini Glass PA-C sent at 6/7/2017  1:33 PM CDT -----  Hi,    Can you or Madison check in with the patient to find out why she missed so many appointments that were scheduled? (4/4, 4/24, 5/30)?  Then we can decide how to proceed.  Let's make sure she's ok and find out what happened those other days.  LEt me know and I will connect with Dr. Mckeon re:  Next steps.     Thanks.     ----- Message -----     From: Lo Khan LPN     Sent: 6/7/2017   9:09 AM       To: Joi Foster, #    Pt called and wanted to reschedule her appt. But Joi is unable to schedule pt due to many no shows. Joi asked I bring this to your attention to decide the next step.   Lo

## 2017-06-07 NOTE — TELEPHONE ENCOUNTER
Writer left a message with daughter Savita as it appears she would prefer to be contacted for any appts that pt may need.

## 2017-06-13 NOTE — TELEPHONE ENCOUNTER
Writer has not received any call backs from messages that has been left with daughter and unable to reach pt.

## 2017-06-13 NOTE — TELEPHONE ENCOUNTER
Called and left a msg for daughter as pt's both numbers listed are not working.   Asked that Savita return my call regarding her mother's missed appt.    Stella Arana RN, BSN  Interventional Radiology Nurse Coordinator   Phone: [phone]

## 2017-06-14 NOTE — TELEPHONE ENCOUNTER
----- Message from Crystal Mckeon MD sent at 6/14/2017  3:31 PM CDT -----  Yes   ----- Message -----     From: Lo Khan LPN     Sent: 6/14/2017   2:26 PM       To: Crystal Mckeon MD, Lo Khan LPN    Would you be willing to fill Xifaxan for pt. Evidently the TCU MD wrote the original script. I have not been able to get in touch with pt nor emergency contact to inquire about the script or the no shows.

## 2017-06-15 NOTE — TELEPHONE ENCOUNTER
Prior Authorization Specialty Medication Request    Medication/Dose: Xifaxan 550mg mg  Diagnosis and ICD: K72.90    New/Renewal/Insurance Change PA: RENEW    Important Lab Values: albumin-2.3, total bili-1.3    Previously Tried and Failed Therapies: lactulose    Rationale: Xifaxan helps to lower the toxin build up in the blood while lactulose works by cleansing the toxin build up in the liver. Adjunctive therapy.      Would you like to include any research articles?      If yes please include the hyperlink(s) below or fax @ [phone].    (Include Name and MRN)    If you received a fax notification from an outside Pharmacy;  Pharmacy Name:    Pharmacy #:    Pharmacy Fax:

## 2017-06-20 NOTE — TELEPHONE ENCOUNTER
[facility] Prior Authorization Team   Phone: [phone]  Fax: [phone]    PA Initiation    Medication: Xifaxan 550mg mg  Insurance Company: [employer] - Phone [phone] Fax [phone]  Pharmacy Filling the Rx: [facility] [address] [place] [address] [address]  Filling Pharmacy Phone: [phone]  Filling Pharmacy Fax:    Start Date: 6/20/2017

## 2017-06-21 NOTE — TELEPHONE ENCOUNTER
Prior Authorization Approval    Authorization Effective Date: 5/20/2017  Authorization Expiration Date: 6/20/2020  Medication: Xifaxan 550mg mg - APPROVED  Approved Dose/Quantity: DAILY  Reference #:     Insurance Company: [employer] - Phone [phone] Fax [phone]  Expected CoPay: $0     CoPay Card Available:      Foundation Assistance Needed:    Which Pharmacy is filling the prescription (Not needed for infusion/clinic administered): [facility] [address] [place] [address] [address] [facility]   Pharmacy Notified: Yes  Patient Notified: Yes

## 2017-07-21 NOTE — TELEPHONE ENCOUNTER
----- Message from Lo Khan LPN sent at 7/21/2017 12:00 PM CDT -----  Pt resides @ [facility] which is a TCU. The NP who is following her wants pt to be seen by liver MD. This is the pt that no showed 3 times with Dr. Mckeon. Unable to obtain a reason why pt no showed. I have held a spot for this Monday with Crystal Mckeon for a 20 minute f/u visit. Transportation will be arranged by the TCU. May pt come in for that appt?  Lo

## 2017-07-21 NOTE — TELEPHONE ENCOUNTER
Writer spoke to Bethanie @ [employer] ([phone]) and pt does not want to keep the appt for Dr. Mckeon. Therefore appts have been cancelled.

## 2018-01-18 NOTE — TELEPHONE ENCOUNTER
[facility] asking for pt if Dr. Mckeon can Rx Xifaxan for pt, then hep clinic can initiate PA. Appears TCU doc has been Rx'ing it, cannot contact him. Please advise. Sent to JOCELYN Blanchard.    
I called [facility] to discuss pt however pt was discharged a month ago. I left a message for there medical record department to find out where pt was transferred as daughter has not returned my call.   
I have reviewed and confirmed nurses' notes for patient's medications, allergies, medical history, and surgical history.

## 2019-02-28 NOTE — PROGRESS NOTES
Social Work Services Discharge Note      Patient Name:  Ursula Crockett     Anticipated Discharge Date:  3/31/17    Discharge Disposition:   TCU:  [facility]    Following MD:  Kahlil INFANTE     Pre-Admission Screening (PAS) online form has been completed.  The Level of Care (LOC) is:  Determined  Confirmation Code is:  NA, pt admitted from facility  Patient/caregiver informed of referral to [facility] for Pre-Admission Screening for skilled nursing facility (SNF) placement and to expect a phone call post discharge from SNF.     Additional Services/Equipment Arranged:  SW arranged for wheelchair van ride via [employer] (ph [phone]) at 3:15pm.      Patient / Family response to discharge plan:  Pt and family in agreement with plan.      Persons notified of above discharge plan:  Patient, RN Rosa, primary team Gold 2, admissions at [facility], pt's son Vahe (ph [phone])      Staff Discharge Instructions:  RN to call report to [phone].  SW to fax discharge orders and signed hard scripts for any controlled substances.  Please print a packet and send with patient.     CTS Handoff completed:  YES    MAHOGANY Doherty, LGSW  [address]   Pager [phone]  Phone [phone]         02/28/2019